# Patient Record
Sex: MALE | URBAN - METROPOLITAN AREA
[De-identification: names, ages, dates, MRNs, and addresses within clinical notes are randomized per-mention and may not be internally consistent; named-entity substitution may affect disease eponyms.]

---

## 2017-02-16 ENCOUNTER — OFFICE VISIT (OUTPATIENT)
Dept: INTERNAL MEDICINE | Age: 52
End: 2017-02-16

## 2017-02-16 VITALS
DIASTOLIC BLOOD PRESSURE: 78 MMHG | OXYGEN SATURATION: 97 % | SYSTOLIC BLOOD PRESSURE: 122 MMHG | BODY MASS INDEX: 26.4 KG/M2 | TEMPERATURE: 97.8 F | WEIGHT: 184 LBS | HEART RATE: 96 BPM

## 2017-02-16 DIAGNOSIS — F34.1 DYSTHYMIA: ICD-10-CM

## 2017-02-16 DIAGNOSIS — E53.8 VITAMIN B12 DEFICIENCY: Chronic | ICD-10-CM

## 2017-02-16 DIAGNOSIS — F42.9 OBSESSIVE-COMPULSIVE DISORDER: Chronic | ICD-10-CM

## 2017-02-16 DIAGNOSIS — J06.9 UPPER RESPIRATORY TRACT INFECTION, UNSPECIFIED TYPE: Primary | ICD-10-CM

## 2017-02-16 PROCEDURE — 99214 OFFICE O/P EST MOD 30 MIN: CPT | Performed by: INTERNAL MEDICINE

## 2017-02-16 PROCEDURE — 96372 THER/PROPH/DIAG INJ SC/IM: CPT | Performed by: INTERNAL MEDICINE

## 2017-02-16 RX ORDER — CYANOCOBALAMIN 1000 UG/ML
1000 INJECTION INTRAMUSCULAR; SUBCUTANEOUS ONCE
Status: COMPLETED | OUTPATIENT
Start: 2017-02-16 | End: 2017-02-16

## 2017-02-16 RX ORDER — CEFDINIR 300 MG/1
300 CAPSULE ORAL 2 TIMES DAILY
Qty: 20 CAPSULE | Refills: 0 | Status: SHIPPED | OUTPATIENT
Start: 2017-02-16 | End: 2019-10-09

## 2017-02-16 RX ADMIN — CYANOCOBALAMIN 1000 MCG: 1000 INJECTION INTRAMUSCULAR; SUBCUTANEOUS at 16:13

## 2017-02-16 ASSESSMENT — PATIENT HEALTH QUESTIONNAIRE - PHQ9
1. LITTLE INTEREST OR PLEASURE IN DOING THINGS: 0
SUM OF ALL RESPONSES TO PHQ9 QUESTIONS 1 & 2: 0
SUM OF ALL RESPONSES TO PHQ QUESTIONS 1-9: 0
2. FEELING DOWN, DEPRESSED OR HOPELESS: 0

## 2017-02-19 RX ORDER — FLUOXETINE HYDROCHLORIDE 20 MG/1
100 CAPSULE ORAL DAILY
COMMUNITY
Start: 2017-02-19

## 2017-02-19 ASSESSMENT — ENCOUNTER SYMPTOMS
WHEEZING: 0
CHOKING: 0
SORE THROAT: 1
VOICE CHANGE: 1
APNEA: 0
CHEST TIGHTNESS: 0
EYES NEGATIVE: 1
GASTROINTESTINAL NEGATIVE: 1
SHORTNESS OF BREATH: 0
STRIDOR: 0
FACIAL SWELLING: 0
SINUS PRESSURE: 0
COUGH: 1

## 2017-05-31 ENCOUNTER — NURSE ONLY (OUTPATIENT)
Dept: INTERNAL MEDICINE | Age: 52
End: 2017-05-31

## 2017-05-31 DIAGNOSIS — E53.8 VITAMIN B12 DEFICIENCY: Primary | Chronic | ICD-10-CM

## 2017-05-31 PROCEDURE — 96372 THER/PROPH/DIAG INJ SC/IM: CPT | Performed by: INTERNAL MEDICINE

## 2017-05-31 RX ORDER — CYANOCOBALAMIN 1000 UG/ML
1000 INJECTION INTRAMUSCULAR; SUBCUTANEOUS ONCE
Status: COMPLETED | OUTPATIENT
Start: 2017-05-31 | End: 2017-05-31

## 2017-05-31 RX ADMIN — CYANOCOBALAMIN 1000 MCG: 1000 INJECTION INTRAMUSCULAR; SUBCUTANEOUS at 09:43

## 2017-07-09 ENCOUNTER — TELEPHONE (OUTPATIENT)
Dept: INTERNAL MEDICINE | Age: 52
End: 2017-07-09

## 2017-07-09 DIAGNOSIS — E78.5 HYPERLIPIDEMIA, UNSPECIFIED HYPERLIPIDEMIA TYPE: ICD-10-CM

## 2017-07-09 DIAGNOSIS — R79.89 ELEVATED LIVER FUNCTION TESTS: Primary | ICD-10-CM

## 2017-07-09 DIAGNOSIS — E03.9 HYPOTHYROIDISM, UNSPECIFIED TYPE: Chronic | ICD-10-CM

## 2017-07-11 PROBLEM — R79.89 ELEVATED LIVER FUNCTION TESTS: Status: ACTIVE | Noted: 2017-07-11

## 2017-07-19 ENCOUNTER — TELEPHONE (OUTPATIENT)
Dept: INTERNAL MEDICINE | Age: 52
End: 2017-07-19

## 2017-07-19 ENCOUNTER — NURSE ONLY (OUTPATIENT)
Dept: INTERNAL MEDICINE | Age: 52
End: 2017-07-19

## 2017-07-19 DIAGNOSIS — E78.5 HYPERLIPIDEMIA, UNSPECIFIED HYPERLIPIDEMIA TYPE: Chronic | ICD-10-CM

## 2017-07-19 DIAGNOSIS — E03.9 HYPOTHYROIDISM, UNSPECIFIED TYPE: Chronic | ICD-10-CM

## 2017-07-19 DIAGNOSIS — R79.89 ELEVATED LIVER FUNCTION TESTS: ICD-10-CM

## 2017-07-19 DIAGNOSIS — E78.5 HYPERLIPIDEMIA, UNSPECIFIED HYPERLIPIDEMIA TYPE: ICD-10-CM

## 2017-07-19 DIAGNOSIS — E53.8 B12 DEFICIENCY: Primary | ICD-10-CM

## 2017-07-19 DIAGNOSIS — E53.8 VITAMIN B12 DEFICIENCY: Chronic | ICD-10-CM

## 2017-07-19 DIAGNOSIS — G47.33 OBSTRUCTIVE SLEEP APNEA: ICD-10-CM

## 2017-07-19 DIAGNOSIS — L98.9 SKIN LESION: Primary | ICD-10-CM

## 2017-07-19 PROBLEM — J06.9 UPPER RESPIRATORY TRACT INFECTION: Status: RESOLVED | Noted: 2017-02-16 | Resolved: 2017-07-19

## 2017-07-19 LAB
ALBUMIN SERPL-MCNC: 4.8 G/DL (ref 3.4–5)
ALP BLD-CCNC: 83 U/L (ref 40–129)
ALT SERPL-CCNC: 48 U/L (ref 10–40)
AST SERPL-CCNC: 38 U/L (ref 15–37)
BILIRUB SERPL-MCNC: 0.5 MG/DL (ref 0–1)
BILIRUBIN DIRECT: <0.2 MG/DL (ref 0–0.3)
BILIRUBIN, INDIRECT: ABNORMAL MG/DL (ref 0–1)
CHOLESTEROL, TOTAL: 203 MG/DL (ref 0–199)
HDLC SERPL-MCNC: 30 MG/DL (ref 40–60)
LDL CHOLESTEROL CALCULATED: 118 MG/DL
TOTAL PROTEIN: 7 G/DL (ref 6.4–8.2)
TRIGL SERPL-MCNC: 276 MG/DL (ref 0–150)
TSH SERPL DL<=0.05 MIU/L-ACNC: 6.77 UIU/ML (ref 0.27–4.2)
VLDLC SERPL CALC-MCNC: 55 MG/DL

## 2017-07-19 PROCEDURE — 96372 THER/PROPH/DIAG INJ SC/IM: CPT | Performed by: INTERNAL MEDICINE

## 2017-07-19 RX ORDER — CYANOCOBALAMIN 1000 UG/ML
1000 INJECTION INTRAMUSCULAR; SUBCUTANEOUS ONCE
Status: COMPLETED | OUTPATIENT
Start: 2017-07-19 | End: 2017-07-19

## 2017-07-19 RX ORDER — LEVOTHYROXINE SODIUM 75 MCG
75 TABLET ORAL DAILY
Qty: 30 TABLET | Refills: 12 | Status: SHIPPED | OUTPATIENT
Start: 2017-07-19

## 2017-07-19 RX ADMIN — CYANOCOBALAMIN 1000 MCG: 1000 INJECTION INTRAMUSCULAR; SUBCUTANEOUS at 16:26

## 2017-07-20 DIAGNOSIS — E03.9 HYPOTHYROIDISM, UNSPECIFIED TYPE: Primary | Chronic | ICD-10-CM

## 2017-07-20 DIAGNOSIS — E53.8 VITAMIN B12 DEFICIENCY: Chronic | ICD-10-CM

## 2017-07-26 ENCOUNTER — NURSE ONLY (OUTPATIENT)
Dept: INTERNAL MEDICINE | Age: 52
End: 2017-07-26

## 2017-07-26 DIAGNOSIS — E53.8 VITAMIN B12 DEFICIENCY: Primary | Chronic | ICD-10-CM

## 2017-07-26 PROCEDURE — 96372 THER/PROPH/DIAG INJ SC/IM: CPT | Performed by: INTERNAL MEDICINE

## 2017-07-26 RX ORDER — CYANOCOBALAMIN 1000 UG/ML
1000 INJECTION INTRAMUSCULAR; SUBCUTANEOUS ONCE
Status: COMPLETED | OUTPATIENT
Start: 2017-07-26 | End: 2017-07-26

## 2017-07-26 RX ADMIN — CYANOCOBALAMIN 1000 MCG: 1000 INJECTION INTRAMUSCULAR; SUBCUTANEOUS at 16:57

## 2017-08-02 ENCOUNTER — NURSE ONLY (OUTPATIENT)
Dept: INTERNAL MEDICINE | Age: 52
End: 2017-08-02

## 2017-08-02 DIAGNOSIS — E53.8 VITAMIN B12 DEFICIENCY: Primary | ICD-10-CM

## 2017-08-02 PROCEDURE — 96372 THER/PROPH/DIAG INJ SC/IM: CPT | Performed by: INTERNAL MEDICINE

## 2017-08-02 RX ORDER — CYANOCOBALAMIN 1000 UG/ML
1000 INJECTION INTRAMUSCULAR; SUBCUTANEOUS ONCE
Status: COMPLETED | OUTPATIENT
Start: 2017-08-02 | End: 2017-08-02

## 2017-08-09 ENCOUNTER — NURSE ONLY (OUTPATIENT)
Dept: INTERNAL MEDICINE | Age: 52
End: 2017-08-09

## 2017-08-09 DIAGNOSIS — E53.8 VITAMIN B12 DEFICIENCY: Primary | ICD-10-CM

## 2017-08-09 PROCEDURE — 96372 THER/PROPH/DIAG INJ SC/IM: CPT | Performed by: INTERNAL MEDICINE

## 2017-08-09 RX ORDER — CYANOCOBALAMIN 1000 UG/ML
1000 INJECTION INTRAMUSCULAR; SUBCUTANEOUS ONCE
Status: COMPLETED | OUTPATIENT
Start: 2017-08-09 | End: 2017-08-09

## 2017-08-09 RX ADMIN — CYANOCOBALAMIN 1000 MCG: 1000 INJECTION INTRAMUSCULAR; SUBCUTANEOUS at 15:24

## 2017-09-27 ENCOUNTER — NURSE ONLY (OUTPATIENT)
Dept: INTERNAL MEDICINE | Age: 52
End: 2017-09-27

## 2017-09-27 ENCOUNTER — TELEPHONE (OUTPATIENT)
Dept: INTERNAL MEDICINE | Age: 52
End: 2017-09-27

## 2017-09-27 DIAGNOSIS — E53.8 VITAMIN B12 DEFICIENCY: Chronic | ICD-10-CM

## 2017-09-27 DIAGNOSIS — Z12.5 SPECIAL SCREENING FOR MALIGNANT NEOPLASM OF PROSTATE: ICD-10-CM

## 2017-09-27 DIAGNOSIS — R79.89 ELEVATED LIVER FUNCTION TESTS: ICD-10-CM

## 2017-09-27 DIAGNOSIS — E53.8 VITAMIN B12 DEFICIENCY: Primary | Chronic | ICD-10-CM

## 2017-09-27 DIAGNOSIS — Z12.5 SPECIAL SCREENING FOR MALIGNANT NEOPLASM OF PROSTATE: Primary | ICD-10-CM

## 2017-09-27 DIAGNOSIS — E78.5 HYPERLIPIDEMIA, UNSPECIFIED HYPERLIPIDEMIA TYPE: Chronic | ICD-10-CM

## 2017-09-27 DIAGNOSIS — E03.9 HYPOTHYROIDISM, UNSPECIFIED TYPE: Chronic | ICD-10-CM

## 2017-09-27 LAB
ALBUMIN SERPL-MCNC: 4.8 G/DL (ref 3.4–5)
ALP BLD-CCNC: 89 U/L (ref 40–129)
ALT SERPL-CCNC: 28 U/L (ref 10–40)
AST SERPL-CCNC: 25 U/L (ref 15–37)
BILIRUB SERPL-MCNC: 0.4 MG/DL (ref 0–1)
BILIRUBIN DIRECT: <0.2 MG/DL (ref 0–0.3)
BILIRUBIN, INDIRECT: NORMAL MG/DL (ref 0–1)
CHOLESTEROL, TOTAL: 209 MG/DL (ref 0–199)
HDLC SERPL-MCNC: 39 MG/DL (ref 40–60)
LDL CHOLESTEROL CALCULATED: 127 MG/DL
PROSTATE SPECIFIC ANTIGEN: 1 NG/ML (ref 0–4)
TOTAL PROTEIN: 7.1 G/DL (ref 6.4–8.2)
TRIGL SERPL-MCNC: 216 MG/DL (ref 0–150)
TSH SERPL DL<=0.05 MIU/L-ACNC: 2.69 UIU/ML (ref 0.27–4.2)
VITAMIN B-12: >2000 PG/ML (ref 211–911)
VLDLC SERPL CALC-MCNC: 43 MG/DL

## 2017-09-27 PROCEDURE — 96372 THER/PROPH/DIAG INJ SC/IM: CPT | Performed by: INTERNAL MEDICINE

## 2017-09-27 RX ORDER — CYANOCOBALAMIN 1000 UG/ML
1000 INJECTION INTRAMUSCULAR; SUBCUTANEOUS ONCE
Status: COMPLETED | OUTPATIENT
Start: 2017-09-27 | End: 2017-09-27

## 2017-09-27 RX ADMIN — CYANOCOBALAMIN 1000 MCG: 1000 INJECTION INTRAMUSCULAR; SUBCUTANEOUS at 14:05

## 2017-09-28 ENCOUNTER — TELEPHONE (OUTPATIENT)
Dept: INTERNAL MEDICINE | Age: 52
End: 2017-09-28

## 2017-09-29 ENCOUNTER — TELEPHONE (OUTPATIENT)
Dept: INTERNAL MEDICINE | Age: 52
End: 2017-09-29

## 2017-12-14 ENCOUNTER — INITIAL CONSULT (OUTPATIENT)
Dept: PULMONOLOGY | Age: 52
End: 2017-12-14

## 2017-12-14 VITALS
OXYGEN SATURATION: 96 % | HEART RATE: 101 BPM | BODY MASS INDEX: 27.2 KG/M2 | DIASTOLIC BLOOD PRESSURE: 60 MMHG | WEIGHT: 190 LBS | HEIGHT: 70 IN | SYSTOLIC BLOOD PRESSURE: 102 MMHG

## 2017-12-14 DIAGNOSIS — K21.9 GASTROESOPHAGEAL REFLUX DISEASE WITHOUT ESOPHAGITIS: Chronic | ICD-10-CM

## 2017-12-14 DIAGNOSIS — E03.9 HYPOTHYROIDISM, UNSPECIFIED TYPE: Chronic | ICD-10-CM

## 2017-12-14 DIAGNOSIS — R06.83 SNORING: ICD-10-CM

## 2017-12-14 DIAGNOSIS — G47.10 HYPERSOMNIA: Primary | ICD-10-CM

## 2017-12-14 PROCEDURE — 99244 OFF/OP CNSLTJ NEW/EST MOD 40: CPT | Performed by: INTERNAL MEDICINE

## 2017-12-14 ASSESSMENT — SLEEP AND FATIGUE QUESTIONNAIRES
HOW LIKELY ARE YOU TO NOD OFF OR FALL ASLEEP WHILE SITTING AND READING: 3
HOW LIKELY ARE YOU TO NOD OFF OR FALL ASLEEP WHEN YOU ARE A PASSENGER IN A CAR FOR AN HOUR WITHOUT A BREAK: 2
HOW LIKELY ARE YOU TO NOD OFF OR FALL ASLEEP WHILE SITTING AND TALKING TO SOMEONE: 0
HOW LIKELY ARE YOU TO NOD OFF OR FALL ASLEEP WHILE WATCHING TV: 2
HOW LIKELY ARE YOU TO NOD OFF OR FALL ASLEEP WHILE SITTING INACTIVE IN A PUBLIC PLACE: 0
ESS TOTAL SCORE: 10
HOW LIKELY ARE YOU TO NOD OFF OR FALL ASLEEP WHILE SITTING QUIETLY AFTER LUNCH WITHOUT ALCOHOL: 1
NECK CIRCUMFERENCE (INCHES): 16
HOW LIKELY ARE YOU TO NOD OFF OR FALL ASLEEP IN A CAR, WHILE STOPPED FOR A FEW MINUTES IN TRAFFIC: 0
HOW LIKELY ARE YOU TO NOD OFF OR FALL ASLEEP WHILE LYING DOWN TO REST IN THE AFTERNOON WHEN CIRCUMSTANCES PERMIT: 2

## 2017-12-14 ASSESSMENT — ENCOUNTER SYMPTOMS
CHOKING: 0
CHEST TIGHTNESS: 0
VOMITING: 0
ABDOMINAL DISTENTION: 0
NAUSEA: 0
RHINORRHEA: 0
PHOTOPHOBIA: 0
SHORTNESS OF BREATH: 0
ALLERGIC/IMMUNOLOGIC NEGATIVE: 1
APNEA: 1
ABDOMINAL PAIN: 0
EYE PAIN: 0

## 2017-12-14 NOTE — PROGRESS NOTES
Isabelle Teran MD, FAA, [facility] HEART AND SURGICAL [facility]  [address]  3rd Floor, [address] [facility], [address] [phone]   [address] [place] [address] 25 [facility]  [address] [place] [address] Nayeli Renee Lulu Husain 37 [phone]     [address] MEDICINE    Subjective:     Patient ID: Bernie Cary is a 45 y.o. male. Chief Complaint   Patient presents with    Sleep Apnea       HPI:        Bernie Cary is a 45 y.o. male referred by Dr. Parish Fenton for a sleep evaluation. He complains of snoring, periods of not breathing, tossing and turning, decreased memory, decreased concentration, excessive daytime sleepiness, awakening in the middle of the night because of headaches, urination, feels sleepy during the day,  and drowsiness while driving but he denies knees buckling with laughing, completely or partially paralyzed while falling asleep or waking up. Symptoms began >20 years ago, gradually worsening since that time. Previous evaluation and treatment has included- PSG. Study over 20 years ago, no old records. Was told was borderline for RAGHU.    DOT/CDL - No  FAA/'s license - No      Previous Report(s) Reviewed: historical medical records, office notes and referral letter/letters     [place] - Total score: 10    Caffeine Intake - 2 cups of caffeinated coffee per day(s)    Social History     Social History    Marital status:      Spouse name: Lowery Buerger Number of children: 3    Years of education: N/A     Occupational History    Not on file.      Social History Main Topics    Smoking status: Former Smoker    Smokeless tobacco: Never Used      Comment: quit smoking in 2007    Alcohol use Yes      Comment: social    Drug use: No    Sexual activity: Yes     Partners: Female      Comment:  - [email or web address]      Other Topics Concern    Not on file     Social History Narrative    Past Medical History     obsessive compulsive disorder    Bender's Hyperlipidemia     Hypothyroidism     Nonspecific elevation of levels of transaminase or lactic acid dehydrogenase (LDH)     Obsessive-compulsive disorder 7/6/2010    Other and unspecified disc disorder of cervical region     Tobacco use disorder     Vitamin B12 deficiency 12/20/2016    Vitamin D deficiency 12/20/2016       Past Surgical History:   Procedure Laterality Date    COLONOSCOPY  December 3, 2004    Dr. Krystle Jaimes    COLONOSCOPY  September 14, 2010    Dr. Krystle Jaimes    COLONOSCOPY  12-15-15    Dr. Jaspreet Carlson  12/20/2016 December 22 2016 Dr Marleni Diez  April 4, 2012    laparoscopic - bilateral - direct inguinal hernia repair (right greater than left) - mesh was used bilaterally ----  Dr. Adeola Salcedo  February 2006    Dr. Jason Hall  June 23, 2009    Dr. Jason Hall  September 14, 2010    Dr. Jason Hall  July 17, 2014    Dr. Umer Acosta  1/26/16    Dr. Derick Dooley       Family History   Problem Relation Age of Onset    Obesity Brother        Review of Systems   Constitutional: Positive for fatigue. Negative for activity change and appetite change. HENT: Positive for congestion. Negative for nosebleeds, postnasal drip, rhinorrhea and sneezing. Eyes: Negative for photophobia, pain and visual disturbance. Respiratory: Positive for apnea. Negative for choking, chest tightness and shortness of breath. Cardiovascular: Negative. Gastrointestinal: Negative for abdominal distention, abdominal pain, nausea and vomiting. Endocrine: Negative for cold intolerance and heat intolerance. Genitourinary: Positive for frequency.  Negative for difficulty Pulmonary/Chest: Effort normal and breath sounds normal. No apnea. No respiratory distress. He has no wheezes. He has no rhonchi. He has no rales. Abdominal: Soft. Bowel sounds are normal. He exhibits no distension. There is no tenderness. Musculoskeletal: Normal range of motion. He exhibits no edema. Lymphadenopathy:        Head (right side): No submental, no submandibular and no tonsillar adenopathy present. Head (left side): No submental, no submandibular and no tonsillar adenopathy present. Neurological: He is alert and oriented to person, place, and time. Skin: Skin is warm, dry and intact. No cyanosis. Nails show no clubbing. Psychiatric: He has a normal mood and affect. His speech is normal and behavior is normal. Thought content normal.   Nursing note and vitals reviewed. Assessment:      1. Hypersomnia  Home Sleep Study    New problem, needs w/u   2. Snoring  Home Sleep Study    New problem, needs w/u   3. Hypothyroidism, unspecified type Stable    4. Gastroesophageal reflux disease without esophagitis Stable         Plan:      Differential diagnosis includes but not limited to: RAGHU, PLMD's, narcolepsy, parasomnias Reviewed RAGHU (highest likelihood Dx): pathophysiology, diagnosis, complications and treatment. Instructed him not to drive if drowsy. Continue medications per her PCP and other physicians. Limit caffeine use after 3pm. Standard of care is to do in-lab PSG but insurance is mandating an inferior HST. 1 wk follow up after study to review his results. The chronic medical conditions listed are directly related to the primary diagnosis listed above. The management of the primary diagnosis affects the secondary diagnosis and vice versa. Cont meds for hypothyroid and GERD.     Orders Placed This Encounter   Procedures   [address] [place] JHAVERI, [address] [facility] [address] [facility] and [address] [facility]

## 2017-12-14 NOTE — LETTER
[facility] Sleep Medicine  [address]  [address]  [place] [address]  Phone: [phone]  Fax: [phone]      December 14, 2017       Patient: Bernie Cary   MR Number: [id number]   YOB: 1965   Date of Visit: 12/14/2017     Thank you for allowing me to participate in the care of Bernie Cary. Here is my assessment and plan. Also attached is a copy of his consult note:    ASSESSMENT:  Yogesh Diaz was seen today for sleep apnea. Diagnoses and all orders for this visit:    Hypersomnia  Comments:  New problem, needs w/u  Orders:  -     Home Sleep Study; Future    Snoring  Comments:  New problem, needs w/u  Orders:  -     Home Sleep Study; Future    Hypothyroidism, unspecified type    Gastroesophageal reflux disease without esophagitis        Plan:     Differential diagnosis includes but not limited to: RAGHU, PLMD's, narcolepsy, parasomnias Reviewed RAGHU (highest likelihood Dx): pathophysiology, diagnosis, complications and treatment. Instructed him not to drive if drowsy. Continue medications per her PCP and other physicians. Limit caffeine use after 3pm. Standard of care is to do in-lab PSG but insurance is mandating an inferior HST. 1 wk follow up after study to review his results. The chronic medical conditions listed are directly related to the primary diagnosis listed above. The management of the primary diagnosis affects the secondary diagnosis and vice versa. Cont meds for hypothyroid and GERD. Orders Placed This Encounter   Procedures    Home Sleep Study         If you have questions or concerns, please do not hesitate to call me. I look forward to following Yogesh Diaz along with you.     Sincerely,      Cesilia Luque MD    CC providers:  Anish Vinson, [address] [facility] [address]  VIA Mail

## 2018-01-08 ENCOUNTER — HOSPITAL ENCOUNTER (OUTPATIENT)
Dept: SLEEP MEDICINE | Age: 53
Discharge: HOME | End: 2018-01-31
Admitting: INTERNAL MEDICINE

## 2018-01-08 DIAGNOSIS — R06.83 SNORING: ICD-10-CM

## 2018-01-08 DIAGNOSIS — G47.10 HYPERSOMNIA: ICD-10-CM

## 2018-01-09 PROCEDURE — 95806 SLEEP STUDY UNATT&RESP EFFT: CPT | Performed by: INTERNAL MEDICINE

## 2018-01-22 ENCOUNTER — TELEPHONE (OUTPATIENT)
Dept: SLEEP MEDICINE | Age: 53
End: 2018-01-22

## 2018-01-22 NOTE — TELEPHONE ENCOUNTER
My chart message given to me by sleep lab  (lew) with pt's correct phone #. Results of study were reviewed with pt. Pt asking why he was not notified sooner due to the severity of his sleep apnea. Explained to pt that the #'s in her chart were both non working #'s. When numbers were read to pr he states the numbers were old numbers. Explained to pt that a letter was sent to his address to let him know we were unable to reach him at the #s we have. Pt states that he did not receive any letter. While on the phone with pt #'s were changed and spouse was added as emergency contact. Pt states that he is angry he spent time filling out paperwork while here and we do not have the information. .  Pt asking about ordering unit and process was explained to pt. Pt may choose a DME or if he doesn't have a preference we order from [address]. Pt states that he would like to have a choice and was told there are many DMEs and suggested to pt he may call his insurance to find who is in network. Pt states he thought we would do that. Tried to explain to pt that his insurance and the DME work this out that we do not provide the unit. Pt asking to speak with Dr. Kumar Corrigan and was told he does not take calls during office hour and that I would be happy to give him a message. Pt states if he does not call me within 24 hours I may not be one of his pts. Message was sent to Dr. Kumar Corrigan. Will wait for pt call to order unit.

## 2018-02-01 ENCOUNTER — HOSPITAL ENCOUNTER (OUTPATIENT)
Dept: OTHER | Age: 53
Discharge: HOME | End: 2018-02-28
Attending: INTERNAL MEDICINE | Admitting: INTERNAL MEDICINE

## 2018-02-02 ENCOUNTER — TELEPHONE (OUTPATIENT)
Dept: INTERNAL MEDICINE | Age: 53
End: 2018-02-02

## 2018-02-02 NOTE — TELEPHONE ENCOUNTER
We received a medical records request from [address] [place] [address] Dr. Destiny Mercedes dated 1-8-2018. I faxed the release to [employer] from our office today. I am also sending records [facility]office mail [facility] [address] [facility]. I will scan the release, the records that I am sending and the fax success into media and attach it to this phone note. I will close this phone note. Please call [facility] for any other questions or information.

## 2018-02-21 ENCOUNTER — OFFICE VISIT (OUTPATIENT)
Dept: DERMATOLOGY | Age: 53
End: 2018-02-21

## 2018-02-21 DIAGNOSIS — L82.1 SK (SEBORRHEIC KERATOSIS): Primary | ICD-10-CM

## 2018-02-21 DIAGNOSIS — L91.8 SKIN TAG: ICD-10-CM

## 2018-02-21 DIAGNOSIS — D23.5 DERMAL NEVUS OF BACK: ICD-10-CM

## 2018-02-21 PROCEDURE — 99202 OFFICE O/P NEW SF 15 MIN: CPT | Performed by: DERMATOLOGY

## 2018-02-21 NOTE — PROGRESS NOTES
[facility] Dermatology  Carlos Florez  1965    45 y.o. male     Date of Visit: 2/21/2018    I was asked to see this patient by Brigido Groves MD.    Chief Complaint: skin lesions    Chief Complaint   Patient presents with    Skin Lesion     R arm, back    Skin Tag     Upper thigh had for 20 years        History of Present Illness: \"Marcus\"    1. He c/o several lesions on the R arm and back - not bothersome. 2.  He c/o an asymptomatic lesion in the groin. 3.  He also complains of a raised lesion on the upper back. Review of Systems:  Skin: No new or changing moles. Past Medical History, Family History, Surgical History, Medications and Allergies reviewed.     Past Medical History:   Diagnosis Date    Allergic rhinitis 5/11/2012    Bender's esophagus     Colon polyps 8/22/2011    Depression     Erectile dysfunction     Gastroesophageal reflux disease without esophagitis 12/2/2015    GERD (gastroesophageal reflux disease)     Hemorrhoids, external     Hyperlipidemia     Hypothyroidism     Nonspecific elevation of levels of transaminase or lactic acid dehydrogenase (LDH)     Obsessive-compulsive disorder 7/6/2010    Other and unspecified disc disorder of cervical region     Tobacco use disorder     Vitamin B12 deficiency 12/20/2016    Vitamin D deficiency 12/20/2016     Past Surgical History:   Procedure Laterality Date    COLONOSCOPY  December 3, 2004    Dr. Lurdes Massey    COLONOSCOPY  September 14, 2010    Dr. Lurdes Massey    COLONOSCOPY  12-15-15    Dr. Modi Body  12/20/2016 December 22 2016 Dr Normie Dakins  April 4, 2012    laparoscopic - bilateral - direct inguinal hernia repair (right greater than left) - mesh was used bilaterally ----  Dr. Michael James

## 2018-04-23 ENCOUNTER — TELEPHONE (OUTPATIENT)
Dept: SLEEP MEDICINE | Age: 53
End: 2018-04-23

## 2018-06-19 ENCOUNTER — OFFICE VISIT (OUTPATIENT)
Dept: DERMATOLOGY | Age: 53
End: 2018-06-19

## 2018-06-19 DIAGNOSIS — L91.8 CUTANEOUS SKIN TAGS: Primary | ICD-10-CM

## 2018-06-19 DIAGNOSIS — L82.1 SK (SEBORRHEIC KERATOSIS): ICD-10-CM

## 2018-06-19 PROCEDURE — 99213 OFFICE O/P EST LOW 20 MIN: CPT | Performed by: DERMATOLOGY

## 2019-02-21 ENCOUNTER — OFFICE VISIT (OUTPATIENT)
Dept: DERMATOLOGY | Age: 54
End: 2019-02-21
Payer: COMMERCIAL

## 2019-02-21 DIAGNOSIS — L91.8 SKIN TAG: ICD-10-CM

## 2019-02-21 DIAGNOSIS — L82.1 SK (SEBORRHEIC KERATOSIS): Primary | ICD-10-CM

## 2019-02-21 DIAGNOSIS — B35.3 TINEA PEDIS OF RIGHT FOOT: ICD-10-CM

## 2019-02-21 PROCEDURE — 99213 OFFICE O/P EST LOW 20 MIN: CPT | Performed by: DERMATOLOGY

## 2019-02-21 RX ORDER — CICLOPIROX 7.7 MG/G
GEL TOPICAL
Qty: 30 G | Refills: 0 | Status: SHIPPED | OUTPATIENT
Start: 2019-02-21 | End: 2019-09-09 | Stop reason: SDUPTHER

## 2019-09-09 ENCOUNTER — OFFICE VISIT (OUTPATIENT)
Dept: DERMATOLOGY | Age: 54
End: 2019-09-09
Payer: COMMERCIAL

## 2019-09-09 DIAGNOSIS — L24.9 IRRITANT DERMATITIS: Primary | ICD-10-CM

## 2019-09-09 DIAGNOSIS — B35.6 TINEA CRURIS: ICD-10-CM

## 2019-09-09 DIAGNOSIS — L82.1 SEBORRHEIC KERATOSIS: ICD-10-CM

## 2019-09-09 PROCEDURE — 99213 OFFICE O/P EST LOW 20 MIN: CPT | Performed by: DERMATOLOGY

## 2019-09-09 RX ORDER — CICLOPIROX 7.7 MG/G
GEL TOPICAL
Qty: 30 G | Refills: 0 | Status: SHIPPED | OUTPATIENT
Start: 2019-09-09 | End: 2020-02-27

## 2019-09-09 RX ORDER — TRIAMCINOLONE ACETONIDE 1 MG/G
CREAM TOPICAL
Qty: 45 G | Refills: 0 | Status: SHIPPED | OUTPATIENT
Start: 2019-09-09 | End: 2020-02-27

## 2019-09-09 NOTE — PROGRESS NOTES
[facility] Dermatology  Janie Seymour MD  [phone]      Bridgette Jean  1965    52 y.o. male     Date of Visit: 9/9/2019    Chief Complaint: rash and skin lesions    History of Present Illness: \"Marcus\"    1. He presents today for a 10 day hx of a pruritic eruption in the groin and axillae. Using Lotrimin with some improvement. 2.  He also complains of a rough lesion on the chest.     Brother recently diagnosed with Mantle cell lymphoma. Review of Systems:  Gen: Feels well, good sense of health. Skin: No new or changing moles. Past Medical History, Family History, Surgical History, Medications and Allergies reviewed.     Past Medical History:   Diagnosis Date    Allergic rhinitis 5/11/2012    Bender's esophagus     Colon polyps 8/22/2011    Depression     Erectile dysfunction     Gastroesophageal reflux disease without esophagitis 12/2/2015    GERD (gastroesophageal reflux disease)     Hemorrhoids, external     Hyperlipidemia     Hypothyroidism     Nonspecific elevation of levels of transaminase or lactic acid dehydrogenase (LDH)     Obsessive-compulsive disorder 7/6/2010    Other and unspecified disc disorder of cervical region     Tobacco use disorder     Vitamin B12 deficiency 12/20/2016    Vitamin D deficiency 12/20/2016     Past Surgical History:   Procedure Laterality Date    COLONOSCOPY  December 3, 2004    Dr. Paula Smith  September 14, 2010    Dr. Honey Smith COLONOSCOPY  12-15-15    Dr. Grace Santana  12/20/2016 December 22 2016 Dr Dariela Arvizu  April 4, 2012    laparoscopic - bilateral - direct inguinal hernia repair (right greater than left) - mesh was used bilaterally ----  Dr. Christopher Noyola  February 2006    Dr. Inocente Angel

## 2019-09-15 ENCOUNTER — PATIENT MESSAGE (OUTPATIENT)
Dept: DERMATOLOGY | Age: 54
End: 2019-09-15

## 2019-09-16 ENCOUNTER — TELEPHONE (OUTPATIENT)
Dept: DERMATOLOGY | Age: 54
End: 2019-09-16

## 2019-09-16 ENCOUNTER — OFFICE VISIT (OUTPATIENT)
Dept: DERMATOLOGY | Age: 54
End: 2019-09-16
Payer: COMMERCIAL

## 2019-09-16 DIAGNOSIS — R21 RASH: Primary | ICD-10-CM

## 2019-09-16 PROCEDURE — 99213 OFFICE O/P EST LOW 20 MIN: CPT | Performed by: DERMATOLOGY

## 2019-09-16 RX ORDER — TRIAMCINOLONE ACETONIDE 1 MG/G
CREAM TOPICAL
Qty: 450 G | Refills: 0 | Status: SHIPPED | OUTPATIENT
Start: 2019-09-16 | End: 2019-11-05

## 2019-09-16 RX ORDER — PREDNISONE 10 MG/1
TABLET ORAL
Qty: 40 TABLET | Refills: 0 | Status: SHIPPED | OUTPATIENT
Start: 2019-09-16 | End: 2019-10-02

## 2019-10-01 ENCOUNTER — OFFICE VISIT (OUTPATIENT)
Dept: DERMATOLOGY | Age: 54
End: 2019-10-01
Payer: COMMERCIAL

## 2019-10-01 DIAGNOSIS — R21 RASH: Primary | ICD-10-CM

## 2019-10-01 PROCEDURE — 99213 OFFICE O/P EST LOW 20 MIN: CPT | Performed by: DERMATOLOGY

## 2019-10-01 PROCEDURE — 11104 PUNCH BX SKIN SINGLE LESION: CPT | Performed by: DERMATOLOGY

## 2019-10-01 RX ORDER — HYDROXYZINE HYDROCHLORIDE 25 MG/1
TABLET, FILM COATED ORAL
Qty: 60 TABLET | Refills: 1 | Status: SHIPPED | OUTPATIENT
Start: 2019-10-01 | End: 2020-02-27

## 2019-10-03 ENCOUNTER — PATIENT MESSAGE (OUTPATIENT)
Dept: DERMATOLOGY | Age: 54
End: 2019-10-03

## 2019-10-03 LAB — DERMATOLOGY PATHOLOGY REPORT: NORMAL

## 2019-10-08 ENCOUNTER — PATIENT MESSAGE (OUTPATIENT)
Dept: DERMATOLOGY | Age: 54
End: 2019-10-08

## 2019-10-09 ENCOUNTER — OFFICE VISIT (OUTPATIENT)
Dept: DERMATOLOGY | Age: 54
End: 2019-10-09
Payer: COMMERCIAL

## 2019-10-09 DIAGNOSIS — R21 RASH: Primary | ICD-10-CM

## 2019-10-09 PROCEDURE — 99213 OFFICE O/P EST LOW 20 MIN: CPT | Performed by: DERMATOLOGY

## 2019-10-09 RX ORDER — PREDNISONE 10 MG/1
TABLET ORAL
Qty: 40 TABLET | Refills: 0 | Status: SHIPPED | OUTPATIENT
Start: 2019-10-09 | End: 2019-10-10 | Stop reason: SDUPTHER

## 2019-10-10 RX ORDER — PREDNISONE 10 MG/1
TABLET ORAL
Qty: 40 TABLET | Refills: 0 | Status: SHIPPED | OUTPATIENT
Start: 2019-10-10 | End: 2019-10-26

## 2019-10-10 RX ORDER — PREDNISONE 10 MG/1
TABLET ORAL
Qty: 40 TABLET | Refills: 0 | OUTPATIENT
Start: 2019-10-10

## 2019-11-04 ENCOUNTER — TELEPHONE (OUTPATIENT)
Dept: DERMATOLOGY | Age: 54
End: 2019-11-04

## 2019-11-05 ENCOUNTER — OFFICE VISIT (OUTPATIENT)
Dept: DERMATOLOGY | Age: 54
End: 2019-11-05
Payer: COMMERCIAL

## 2019-11-05 DIAGNOSIS — L73.9 FOLLICULITIS: ICD-10-CM

## 2019-11-05 DIAGNOSIS — R21 RASH: Primary | ICD-10-CM

## 2019-11-05 PROCEDURE — 99213 OFFICE O/P EST LOW 20 MIN: CPT | Performed by: DERMATOLOGY

## 2020-02-27 ENCOUNTER — OFFICE VISIT (OUTPATIENT)
Dept: DERMATOLOGY | Age: 55
End: 2020-02-27
Payer: COMMERCIAL

## 2020-02-27 PROCEDURE — 99213 OFFICE O/P EST LOW 20 MIN: CPT | Performed by: DERMATOLOGY

## 2020-02-27 RX ORDER — DEXTROAMPHETAMINE SACCHARATE, AMPHETAMINE ASPARTATE, DEXTROAMPHETAMINE SULFATE AND AMPHETAMINE SULFATE 5; 5; 5; 5 MG/1; MG/1; MG/1; MG/1
TABLET ORAL
COMMUNITY
Start: 2020-02-19 | End: 2021-02-19

## 2020-02-27 RX ORDER — ATORVASTATIN CALCIUM 40 MG/1
40 TABLET, FILM COATED ORAL DAILY
COMMUNITY

## 2020-02-27 NOTE — PROGRESS NOTES
Pending sale to [facility]  Anselmo Hummel MD  [phone]      Calli Montilla  1965    62 y.o. male     Date of Visit: 2/27/2020    Chief Complaint: skin lesions    History of Present Illness:    1. He reports few stable lesions of the right temple. 2.  He also complains of several asymptomatic growths on the left arm and back. 3.  He has dry skin that is asymptomatic. Review of Systems:  Gen: Feels well, good sense of health. Skin: No new or changing moles. Past Medical History, Family History, Surgical History, Medications and Allergies reviewed.     Past Medical History:   Diagnosis Date    Allergic rhinitis 5/11/2012    Bender's esophagus     Colon polyps 8/22/2011    Depression     Erectile dysfunction     Gastroesophageal reflux disease without esophagitis 12/2/2015    GERD (gastroesophageal reflux disease)     Hemorrhoids, external     Hyperlipidemia     Hypothyroidism     Nonspecific elevation of levels of transaminase or lactic acid dehydrogenase (LDH)     Obsessive-compulsive disorder 7/6/2010    Other and unspecified disc disorder of cervical region     Tobacco use disorder     Vitamin B12 deficiency 12/20/2016    Vitamin D deficiency 12/20/2016     Past Surgical History:   Procedure Laterality Date    COLONOSCOPY  December 3, 2004    Dr. Winnie Soriano  September 14, 2010    Dr. Milind Wilson COLONOSCOPY  12-15-15    Dr. Charleen Sanchez  12/20/2016 December 22 2016 Dr Errol Zavala  April 4, 2012    laparoscopic - bilateral - direct inguinal hernia repair (right greater than left) - mesh was used bilaterally ----  Dr. Margy Jensen  February 2006    Dr. Bernice Workman  June 23, 2009    Dr. Steffi Rodriguez GASTROINTESTINAL ENDOSCOPY  September 14, 2010    Dr. Fani York ENDOSCOPY  July 17, 2014    Dr. Leighann Greco  1/26/16    Dr. Jeny Vazquez       No Known Allergies  Outpatient Medications Marked as Taking for the 2/27/20 encounter (Office Visit) with Maria R Sawyer MD   Medication Sig Dispense Refill    amphetamine-dextroamphetamine (ADDERALL) 20 MG tablet Take 1 tablet by mouth three times per day.  atorvastatin (LIPITOR) 40 MG tablet Take 40 mg by mouth daily      mupirocin (BACTROBAN) 2 % ointment Apply to affected area on the left leg twice daily until improved. 22 g 0    Cyanocobalamin 1000 MCG/ML KIT 1 mL by NOT APPLICABLE route      SYNTHROID 75 MCG tablet Take 1 tablet by mouth daily (Patient taking differently: Take 100 mcg by mouth daily ) 30 tablet 12    FLUoxetine (PROZAC) 20 MG capsule Take 5 capsules by mouth daily      rosuvastatin (CRESTOR) 10 MG tablet Take 1 tablet by mouth daily 30 tablet 12    dexlansoprazole (DEXILANT) 60 MG CPDR delayed release capsule Take 1 capsule by mouth daily           Physical Examination       The following were examined and determined to be normal: Psych/Neuro, Scalp/hair, Head/face, Conjunctivae/eyelids, Gums/teeth/lips, Neck, Breast/axilla/chest, Abdomen, Back, RUE, LUE, RLE, LLE and Nails/digits. The following were examined and determined to be abnormal: None. Well appearing. 1. Right temple with 2 well defined round smooth light brown macules. 2. Left arm, back with stuck on appearing verrucous grey brown papule. 3.  Arms and lower extremities with diffuse scaling of the skin. Assessment and Plan     1. Solar lentigo x 2     Monitor for change. 2. SK (seborrheic keratosis)     Reassurance. 3. Xerosis cutis     Encouraged daily use of emollients. Return in about 1 year (around 2/27/2021).

## 2020-11-19 ENCOUNTER — TELEPHONE (OUTPATIENT)
Dept: DERMATOLOGY | Age: 55
End: 2020-11-19

## 2020-11-19 NOTE — TELEPHONE ENCOUNTER
Pt calling states his daughter is in town will be a NP have something on her ankle would like to have  take a look at pls return call back @ [phone] to discuss

## 2020-12-07 ENCOUNTER — PATIENT MESSAGE (OUTPATIENT)
Dept: DERMATOLOGY | Age: 55
End: 2020-12-07

## 2020-12-07 RX ORDER — CICLOPIROX 7.7 MG/G
GEL TOPICAL
Qty: 30 G | Refills: 0 | Status: SHIPPED | OUTPATIENT
Start: 2020-12-07

## 2020-12-08 ENCOUNTER — PATIENT MESSAGE (OUTPATIENT)
Dept: DERMATOLOGY | Age: 55
End: 2020-12-08

## 2020-12-08 NOTE — TELEPHONE ENCOUNTER
From: Noel Branch  To: Vega Mcpherson MD  Sent: 12/8/2020 10:59 AM EST  Subject: Non-Urgent Medical Question    Thank you.      ----- Message -----   [facility]   FQUY:00/1/5579 8:52 AM EST   To:Hiro Bermeo   Subject:RE: Non-Urgent Medical Question    Dr Tamar Farias sent in a Ciclopirox (LOPROX) 0.77 % gel for you to use. Thank you,   Jess Ovalles       ----- Message -----   From:Hiro Bermeo   Sent:12/7/2020 9:28 AM EST   To:Nelson Kahn MD   Subject:Non-Urgent Medical Question    Hi Doc M. First, I hope you and the family are well! 2nd, thanks for seeing my daughter Doug Rizzo on such short notice. She had great things to say about you. Lastly, I've had jock itch for about a week and been using an over the counter athletes foot spray. I remember at one point you had prescribed something a bit stronger. Would you please do that again?     thanks [employer]

## 2020-12-11 ENCOUNTER — TELEPHONE (OUTPATIENT)
Dept: DERMATOLOGY | Age: 55
End: 2020-12-11

## 2020-12-11 ENCOUNTER — PATIENT MESSAGE (OUTPATIENT)
Dept: DERMATOLOGY | Age: 55
End: 2020-12-11

## 2020-12-11 NOTE — TELEPHONE ENCOUNTER
Patient is calling complaining of a rash that he was seen for previously, he says it has returned and would like to get ahead of it before it worsens.  Please call to schedule     [phone]

## 2020-12-11 NOTE — TELEPHONE ENCOUNTER
From: Melanie Doshi  To: Jess Bartlett MD  Sent: 12/11/2020 12:07 PM EST  Subject: Non-Urgent Medical Question    Please ask Dr Erika Quintanilla to call me at his convenience. I'm now beginning to have the itchy rash on my back and arm pit area again I had a year ago. I'd like to try and get this taken care of quickly before it gets as bad as last year. Thanks for your help! Jessica Mirna [phone]      ----- Message -----   From:BABAK Rg   TCRO:92/3/3640 8:52 AM EST   To:Hiro Bermeo   Subject:RE: Non-Urgent Medical Question    [facility],     Dr Erika Quintanilla sent in a Ciclopirox (LOPROX) 0.77 % gel for you to use. Thank you,   Keaton Swanson       ----- Message -----   From:Hiro Bermeo   Sent:12/7/2020 9:28 AM EST   To:Nelson Landa MD   Subject:Non-Urgent Medical Question    Hi Doc M. First, I hope you and the family are well! 2nd, thanks for seeing my daughter Christopher Mobley on such short notice. She had great things to say about you. Lastly, I've had jock itch for about a week and been using an over the counter athletes foot spray. I remember at one point you had prescribed something a bit stronger. Would you please do that again?     thanks Jessica Bradley

## 2020-12-14 ENCOUNTER — OFFICE VISIT (OUTPATIENT)
Dept: DERMATOLOGY | Age: 55
End: 2020-12-14
Payer: COMMERCIAL

## 2020-12-14 VITALS — TEMPERATURE: 97.3 F

## 2020-12-14 PROCEDURE — 99213 OFFICE O/P EST LOW 20 MIN: CPT | Performed by: DERMATOLOGY

## 2020-12-14 RX ORDER — TRIAMCINOLONE ACETONIDE 1 MG/G
CREAM TOPICAL
Qty: 450 G | Refills: 0 | Status: SHIPPED | OUTPATIENT
Start: 2020-12-14 | End: 2021-09-09 | Stop reason: SDUPTHER

## 2020-12-14 NOTE — PROGRESS NOTES
[facility] Dermat[facility]  Goyo Floyd MD  [phone]      Jorge Gregoryer  1965    60 y.o. male     Date of Visit: 12/14/2020    Chief Complaint: rash    History of Present Illness:    1. He presents today for a 3 week history of a pruritic eruption on the trunk and extremities. He also complains of involvement in the groin. He has been using Loprox cream over the last 5 days with minimal improvement. Itching is worse with heat and spicy foods. He has also been using a hot tub. Uses [place] Spring soap    2. He also complains of a persistent lesion on the right temple and right cheek. BRCA positive. Review of Systems:  Skin: No other concerning lesions or changing moles. Past Medical History, Family History, Surgical History, Medications and Allergies reviewed.     Past Medical History:   Diagnosis Date    Allergic rhinitis 5/11/2012    Bender's esophagus     Colon polyps 8/22/2011    Depression     Erectile dysfunction     Gastroesophageal reflux disease without esophagitis 12/2/2015    GERD (gastroesophageal reflux disease)     Hemorrhoids, external     Hyperlipidemia     Hypothyroidism     Nonspecific elevation of levels of transaminase or lactic acid dehydrogenase (LDH)     Obsessive-compulsive disorder 7/6/2010    Other and unspecified disc disorder of cervical region     Tobacco use disorder     Vitamin B12 deficiency 12/20/2016    Vitamin D deficiency 12/20/2016     Past Surgical History:   Procedure Laterality Date    COLONOSCOPY  December 3, 2004    Dr. Bradly Vaca    COLONOSCOPY  September 14, 2010    Dr. Bradly Vaca    COLONOSCOPY  12-15-15    Dr. Rochelle Shipman  12/20/2016 December 22 2016 Dr Giuliana Forrest  April 4, 2012    laparoscopic - bilateral - direct inguinal hernia repair (right greater than left) - mesh was used bilaterally ----  Dr. Elver Plascencia SURGERY  1971    PILONIDAL CYST EXCISION  1974    UPPER GASTROINTESTINAL ENDOSCOPY  February 2006    Dr. Uzma Allan ENDOSCOPY  June 23, 2009    Dr. Da Hendrickson  September 14, 2010    Dr. Da Hendrickson  July 17, 2014    Dr. Otoniel Holloway  1/26/16    Dr. Leticia Coronado       No Known Allergies  Outpatient Medications Marked as Taking for the 12/14/20 encounter (Office Visit) with Milind Damon MD   Medication Sig Dispense Refill    triamcinolone (KENALOG) 0.1 % cream Apply to affected area twice daily for up to 2 weeks or until improved. 450 g 0    Ciclopirox (LOPROX) 0.77 % gel Apply to the last web space on the right foot twice daily until improved. 30 g 0    amphetamine-dextroamphetamine (ADDERALL) 20 MG tablet Take 1 tablet by mouth three times per day.  Cyanocobalamin 1000 MCG/ML KIT 1 mL by NOT APPLICABLE route      SYNTHROID 75 MCG tablet Take 1 tablet by mouth daily (Patient taking differently: Take 100 mcg by mouth daily ) 30 tablet 12    FLUoxetine (PROZAC) 20 MG capsule Take 5 capsules by mouth daily      dexlansoprazole (DEXILANT) 60 MG CPDR delayed release capsule Take 1 capsule by mouth daily           Physical Examination       The following were examined and determined to be normal: Psych/Neuro, Scalp/hair, Head/face, Conjunctivae/eyelids, Gums/teeth/lips, Neck, Abdomen and Nails/digits. The following were examined and determined to be abnormal: Breast/axilla/chest, Back, RUE, LUE, RLE and LLE. Well appearing. 1.  Periaxillary regions, back, upper portions of the arms and lower extremities with ill-defined scaly erythematous patches. Inguinal folds with focally lichenified scaly erythematous patches. 2.  Right central cheek with a stuck on appearing verrucous brown papule.   Right temple with a stuck on appearing verrucous skin colored papule. Assessment and Plan     1. Chronic dermatitis -moderately extensive     Triamcinolone 0.1% cream twice daily until improved. Switch to fragrance free gentle cleanser like Dove for sensitive skin for bathing and limited to intertriginous regions. Cream based moisturizer 1-2 times daily. Avoid hot tub. 2. SK (seborrheic keratosis)     Reassurance. Return in about 1 year (around 12/14/2021).     --Milind Damon MD

## 2020-12-14 NOTE — PATIENT INSTRUCTIONS
DRY SKIN CARE    1. Do not take more than 1 shower or bath each day. Try to spend 10 minutes or less in the shower/bath. Avoid hot showers as this can damage the skin and make the dryness worse. 2. Use a moisturizing or mild soap such Dove (for sensitive skin), Cetaphil (Cleansing Bar,Gentle Body Wash, Restoraderm Soothing Wash), CeraVe Hydrating Body Wash, Eucerin Skin Calming Body Wash, or Aveeno Daily Moisturizing Body Wash. Antibacterial soaps can be too drying. 3. Use soap only on limited areas (face, underarms, groin and buttocks). Try to avoid using soap on the arms, legs, trunk and back. 4. After showering, pat dry with a towel and generously apply a thick moisturizer all over. Use a moisturizer every day even if you are not showering that particular day. 5. If you are able, apply the moisturizer a second time during the day as well. 6. If a prescription cream or ointment has been ordered for you, apply the prescription medication to affected areas after your bath/shower while the skin is still damp, then apply the moisturizer to the remainder of the skin. 7. When it comes to moisturizers, the thicker the better. Ointments (such as vaseline) are thicker than creams, and creams are thicker than lotions. Suggested over-the-counter moisturizers include:    · CeraVe Lotion or Cream  · Cetaphil Lotion or Cream  · Eucerin Advanced Repair Cream, Advanced Repair Lotion, Eczema Relief Cream or Intensive Repair Lotion.    · Lubriderm Lotion  · Vaseline (petroleum jelly)  · Aquaphor  · Anabel moisturizing lotion or cream  · Neutrogena Hand Cream  · Aveeno Moisturizing Cream or Lotion  · Curel Ultra Healing   · Vanicream Moisturizing Skin Cream

## 2021-05-10 ENCOUNTER — OFFICE VISIT (OUTPATIENT)
Dept: DERMATOLOGY | Age: 56
End: 2021-05-10
Payer: COMMERCIAL

## 2021-05-10 VITALS — TEMPERATURE: 97.4 F

## 2021-05-10 DIAGNOSIS — L81.4 SOLAR LENTIGO: Primary | ICD-10-CM

## 2021-05-10 DIAGNOSIS — L30.9 CHRONIC DERMATITIS: ICD-10-CM

## 2021-05-10 DIAGNOSIS — L82.1 SK (SEBORRHEIC KERATOSIS): ICD-10-CM

## 2021-05-10 PROCEDURE — 99213 OFFICE O/P EST LOW 20 MIN: CPT | Performed by: DERMATOLOGY

## 2021-05-10 NOTE — PROGRESS NOTES
[facility]  Brittany Barahona MD  [phone]      Chantelle Lundberg  1965    60 y.o. male     Date of Visit: 5/10/2021    Chief Complaint: skin lesions    History of Present Illness:    1. He reports a couple of persistent pigmented lesions on the right cheek that have may be gradually gotten a little larger. 2.  He complains of a couple of growths on the left temple, left shoulder and left forearm. 3.  Follow-up for history of chronic dermatitis on the trunk and extremities. Cleared with use of triamcinolone 0.1% cream.  He denies any recurrence. BRCA positive. Review of Systems:  Gen: Feels well, good sense of health. Past Medical History, Family History, Surgical History, Medications and Allergies reviewed.     Past Medical History:   Diagnosis Date    Allergic rhinitis 5/11/2012    Bender's esophagus     Colon polyps 8/22/2011    Depression     Erectile dysfunction     Gastroesophageal reflux disease without esophagitis 12/2/2015    GERD (gastroesophageal reflux disease)     Hemorrhoids, external     Hyperlipidemia     Hypothyroidism     Nonspecific elevation of levels of transaminase or lactic acid dehydrogenase (LDH)     Obsessive-compulsive disorder 7/6/2010    Other and unspecified disc disorder of cervical region     Tobacco use disorder     Vitamin B12 deficiency 12/20/2016    Vitamin D deficiency 12/20/2016     Past Surgical History:   Procedure Laterality Date    COLONOSCOPY  December 3, 2004    Dr. Teressa Braun  September 14, 2010    Dr. Tim Paredes    COLONOSCOPY  12-15-15    Dr. Tamara Rodriguez  12/20/2016 December 22 2016 Dr Perla Expose  April 4, 2012    laparoscopic - bilateral - direct inguinal hernia repair (right greater than left) - mesh was used bilaterally ----  Dr. Elgin Donohue [facility] ENDOSCOPY  February 2006    Dr. Neda Guardado   [facility] UPPER GASTROINTESTINAL ENDOSCOPY  June 23, 2009    Dr. Ayush Yeh  September 14, 2010    Dr. Ayush Yeh  July 17, 2014    Dr. Isha Mims  1/26/16    Dr. Jie Laguna       No Known Allergies  Outpatient Medications Marked as Taking for the 5/10/21 encounter (Office Visit) with Wallace Pardo MD   Medication Sig Dispense Refill    triamcinolone (KENALOG) 0.1 % cream Apply to affected area twice daily for up to 2 weeks or until improved. 450 g 0    Ciclopirox (LOPROX) 0.77 % gel Apply to the last web space on the right foot twice daily until improved. 30 g 0    atorvastatin (LIPITOR) 40 MG tablet Take 40 mg by mouth daily      Cyanocobalamin 1000 MCG/ML KIT 1 mL by NOT APPLICABLE route      SYNTHROID 75 MCG tablet Take 1 tablet by mouth daily (Patient taking differently: Take 100 mcg by mouth daily ) 30 tablet 12    FLUoxetine (PROZAC) 20 MG capsule Take 5 capsules by mouth daily      dexlansoprazole (DEXILANT) 60 MG CPDR delayed release capsule Take 1 capsule by mouth daily           Physical Examination       The following were examined and determined to be normal: Psych/Neuro, Scalp/hair, Head/face, Conjunctivae/eyelids, Gums/teeth/lips, Neck, Breast/axilla/chest, Abdomen, Back, RUE, LUE, RLE, LLE and Nails/digits. The following were examined and determined to be abnormal: None. Well-appearing. 1.  Right temple and superior lateral cheek with 2 well-defined round smooth light brown macules. 2.  Left temple with a small stuck on appearing verrucous brown papule. Left supraclavicular region with a small stuck on appearing verrucous light brown papule. Left mid extensor forearm with a stuck on appearing verrucous gray papule. 3.  Clear. Assessment and Plan     1.  Solar lentigo x 2 - benign appearing    Monitor for change. Encouraged sun protective behaviors including use of a hat and at least SPF 30 sunscreen. 2. SK (seborrheic keratosis)     Reassurance. 3. Chronic dermatitis - clear    Triamcinolone 0.1% cream twice daily as needed for recurrences. Return in about 1 year (around 5/10/2022).     --Shashank Mcintosh MD

## 2021-08-12 ENCOUNTER — PATIENT MESSAGE (OUTPATIENT)
Dept: DERMATOLOGY | Age: 56
End: 2021-08-12

## 2021-08-12 NOTE — TELEPHONE ENCOUNTER
From: Melanie Doshi  To: Jess Bartlett MD  Sent: 8/12/2021 1:42 PM EDT  Subject: Non-Urgent Medical Question    Hi Dr Erika Quintanilla- I hope you are well. I've had significant itching, thinking it's jock itch. I've used Lotrimin spray and had no relief.  Any recommendations? (sorry for the pic:)    Thanks    Vaishali Peace  [phone]

## 2021-08-13 RX ORDER — TRIAMCINOLONE ACETONIDE 1 MG/G
CREAM TOPICAL
Qty: 30 G | Refills: 2 | Status: SHIPPED | OUTPATIENT
Start: 2021-08-13 | End: 2021-09-09

## 2021-08-23 ENCOUNTER — TELEPHONE (OUTPATIENT)
Dept: DERMATOLOGY | Age: 56
End: 2021-08-23

## 2021-08-23 NOTE — TELEPHONE ENCOUNTER
Calling about rash in groin, upper arm, and elbow. For about 10 days. Using RX for groin but nothing for arm. Itching and blistering. Had something similar last year. Calling to see if he can get an appointment. Phone.  [phone]

## 2021-08-23 NOTE — TELEPHONE ENCOUNTER
Spoke to patient-he has not tried the triamcinolone since the prescription was sent on 8/13/2021. I advise patient to start using the cream on the current, itchy spots and that the 30 gram tube should last until Dr. Eve Alcantar is back in the office. Just to confirm: patient is to use triamcinolone on the arm, elbow, arm pit and groin?

## 2021-09-08 ENCOUNTER — TELEPHONE (OUTPATIENT)
Dept: DERMATOLOGY | Age: 56
End: 2021-09-08

## 2021-09-08 NOTE — TELEPHONE ENCOUNTER
Pt of Dr. Angy Ornelas   Pt is complaining of a rash on inner thigh area, itchy. Been there for a few weeks now. He would like to be seen tomorrow. Please advise, Thank you.

## 2021-09-09 ENCOUNTER — OFFICE VISIT (OUTPATIENT)
Dept: DERMATOLOGY | Age: 56
End: 2021-09-09
Payer: COMMERCIAL

## 2021-09-09 VITALS — TEMPERATURE: 97.9 F

## 2021-09-09 DIAGNOSIS — L30.9 DERMATITIS: Primary | ICD-10-CM

## 2021-09-09 PROCEDURE — 99213 OFFICE O/P EST LOW 20 MIN: CPT | Performed by: DERMATOLOGY

## 2021-09-09 RX ORDER — TRIAMCINOLONE ACETONIDE 1 MG/G
CREAM TOPICAL
Qty: 450 G | Refills: 0 | Status: SHIPPED | OUTPATIENT
Start: 2021-09-09

## 2021-09-09 NOTE — PROGRESS NOTES
[facility]  Anette Marie MD  [phone]      Sadia Guzman  1965    60 y.o. male     Date of Visit: 9/9/2021    Chief Complaint: rash    History of Present Illness:    1. He complains of a rash on the neck, axillae and groin that has been present for several weeks. He used some triamcinolone 0.1% cream recently with improvement in pruritus. Review of Systems:  Gen: Feels well, good sense of health. Past Medical History, Family History, Surgical History, Medications and Allergies reviewed.     Past Medical History:   Diagnosis Date    Allergic rhinitis 5/11/2012    Bender's esophagus     Colon polyps 8/22/2011    Depression     Erectile dysfunction     Gastroesophageal reflux disease without esophagitis 12/2/2015    GERD (gastroesophageal reflux disease)     Hemorrhoids, external     Hyperlipidemia     Hypothyroidism     Nonspecific elevation of levels of transaminase or lactic acid dehydrogenase (LDH)     Obsessive-compulsive disorder 7/6/2010    Other and unspecified disc disorder of cervical region     Tobacco use disorder     Vitamin B12 deficiency 12/20/2016    Vitamin D deficiency 12/20/2016     Past Surgical History:   Procedure Laterality Date    COLONOSCOPY  December 3, 2004    Dr. Stella Adair  September 14, 2010    Dr. Lyndsey Hayden COLONOSCOPY  12-15-15    Dr. Angela Riley  12/20/2016 December 22 2016 Dr Cuauhtemoc Lew  April 4, 2012    laparoscopic - bilateral - direct inguinal hernia repair (right greater than left) - mesh was used bilaterally ----  Dr. Michael Silver  February 2006    Dr. Justina Terry  June 23, 2009    Dr. Justina Terry  September 14, 2010     [facility]    UPPER GASTROINTESTINAL ENDOSCOPY  July 17, 2014    Dr. Hermosillo All  1/26/16    Dr. Jonna Soria       No Known Allergies  Outpatient Medications Marked as Taking for the 9/9/21 encounter (Office Visit) with Chastity Vivar MD   Medication Sig Dispense Refill    triamcinolone (KENALOG) 0.1 % cream Apply to affected area twice daily for up to 2 weeks or until improved. 450 g 0    Ciclopirox (LOPROX) 0.77 % gel Apply to the last web space on the right foot twice daily until improved. 30 g 0    atorvastatin (LIPITOR) 40 MG tablet Take 40 mg by mouth daily      Cyanocobalamin 1000 MCG/ML KIT 1 mL by NOT APPLICABLE route      SYNTHROID 75 MCG tablet Take 1 tablet by mouth daily (Patient taking differently: Take 100 mcg by mouth daily ) 30 tablet 12    FLUoxetine (PROZAC) 20 MG capsule Take 5 capsules by mouth daily      dexlansoprazole (DEXILANT) 60 MG CPDR delayed release capsule Take 1 capsule by mouth daily             Physical Examination       The following were examined and determined to be normal: Psych/Neuro, RUE and LUE. The following were examined and determined to be abnormal: Neck, RLE and LLE. Well appearing. 1.  Left lower neck - ill defined scaly erythematous patch. Proximal inner thighs with ill defined round scaly erythematous papules and patches. Assessment and Plan     1. Dermatitis of the neck and proximal thighs - likely irritant etiology from sweat and physicial activity    Triamcinolone 0.1% cream twice daily until improved.           --Chastity Vivar MD

## 2021-10-07 ENCOUNTER — TELEPHONE (OUTPATIENT)
Dept: DERMATOLOGY | Age: 56
End: 2021-10-07

## 2021-10-07 NOTE — TELEPHONE ENCOUNTER
Patient states he has a brownish, raised, itchy mole by collar bone he noticed a couple of days ago. Patient also has a swollen lymph node by his collar bone close to mole. Patient is requesting a return call to schedule an appt asap. Please advise. Thank you!

## 2021-10-07 NOTE — TELEPHONE ENCOUNTER
Informed patient that Dr Jarett Rodriguez currently doesn't have any upcoming openings as of now. However I let patient know that I will contact them when we get a cancellation. Patient verbalized understanding. He is flexible for scheduling.

## 2021-10-13 NOTE — TELEPHONE ENCOUNTER
Pt called and was not able to make his appt for 10/13 due to a work conflict. He sends his apologizes and hoping to be put back on the cancellation list. I did let him know of his appt in Dec.   Please advise, Thank you!

## 2021-10-19 NOTE — TELEPHONE ENCOUNTER
Called and left message for patient to call back office. Please offer cancellation for 10/20/21 if still available.

## 2021-10-21 NOTE — TELEPHONE ENCOUNTER
Called and left message for patient to call back office.      Please offer cancellation for 10/22/21 if still available.

## 2021-10-27 ENCOUNTER — OFFICE VISIT (OUTPATIENT)
Dept: DERMATOLOGY | Age: 56
End: 2021-10-27
Payer: COMMERCIAL

## 2021-10-27 VITALS — TEMPERATURE: 97.3 F

## 2021-10-27 DIAGNOSIS — L82.1 SK (SEBORRHEIC KERATOSIS): Primary | ICD-10-CM

## 2021-10-27 PROCEDURE — 99212 OFFICE O/P EST SF 10 MIN: CPT | Performed by: DERMATOLOGY

## 2021-10-27 NOTE — PROGRESS NOTES
[facility]  Camila Rodriguez MD  [phone]      Cain Mcmahan  1965    60 y.o. male     Date of Visit: 10/27/2021    Chief Complaint: skin lesions    History of Present Illness:    1. He complains of a couple of asymptomatic lesions on the left supraclavicular region and back. Review of Systems:  Gen: Feels well, good sense of health. Past Medical History, Family History, Surgical History, Medications and Allergies reviewed.     Past Medical History:   Diagnosis Date    Allergic rhinitis 5/11/2012    Bender's esophagus     Colon polyps 8/22/2011    Depression     Erectile dysfunction     Gastroesophageal reflux disease without esophagitis 12/2/2015    GERD (gastroesophageal reflux disease)     Hemorrhoids, external     Hyperlipidemia     Hypothyroidism     Nonspecific elevation of levels of transaminase or lactic acid dehydrogenase (LDH)     Obsessive-compulsive disorder 7/6/2010    Other and unspecified disc disorder of cervical region     Tobacco use disorder     Vitamin B12 deficiency 12/20/2016    Vitamin D deficiency 12/20/2016     Past Surgical History:   Procedure Laterality Date    COLONOSCOPY  December 3, 2004    Dr. Jenny Roland  September 14, 2010    Dr. Dimitris Parikh COLONOSCOPY  12-15-15    Dr. Jake Galaviz  12/20/2016 December 22 2016 Dr Ailyn Garcia  April 4, 2012    laparoscopic - bilateral - direct inguinal hernia repair (right greater than left) - mesh was used bilaterally ----  Dr. Kory Ashford  February 2006    Dr. Ammon Fernando  June 23, 2009    Dr. Ammon Fernando  September 14, 2010    Dr. Ammon Fernando  July 17, 2014     [facility]    UPPER GASTROINTESTINAL ENDOSCOPY  1/26/16    Dr. Oxana Son       No Known Allergies  Outpatient Medications Marked as Taking for the 10/27/21 encounter (Office Visit) with Cecy Oviedo MD   Medication Sig Dispense Refill    triamcinolone (KENALOG) 0.1 % cream Apply to affected area twice daily for up to 2 weeks or until improved. 450 g 0    Ciclopirox (LOPROX) 0.77 % gel Apply to the last web space on the right foot twice daily until improved. 30 g 0    atorvastatin (LIPITOR) 40 MG tablet Take 40 mg by mouth daily      Cyanocobalamin 1000 MCG/ML KIT 1 mL by NOT APPLICABLE route      SYNTHROID 75 MCG tablet Take 1 tablet by mouth daily (Patient taking differently: Take 100 mcg by mouth daily ) 30 tablet 12    FLUoxetine (PROZAC) 20 MG capsule Take 5 capsules by mouth daily      dexlansoprazole (DEXILANT) 60 MG CPDR delayed release capsule Take 1 capsule by mouth daily         Physical Examination       Well appearing. 1.  Left supraclavicular region and right mid back with 2 stuck on appearing round verrucous gray-brown papules. Assessment and Plan     1. SK (seborrheic keratosis)     Reassurance.           --Cecy Oviedo MD

## 2022-02-18 ENCOUNTER — TELEPHONE (OUTPATIENT)
Dept: DERMATOLOGY | Age: 57
End: 2022-02-18

## 2022-02-18 NOTE — TELEPHONE ENCOUNTER
Pt calling wanting to see  for a rash he have been dealing with off and on he is scheduled on 7/5 and he is on the wait list as well  have some concern wants to be seen sooner pls return call back @ [phone] to discuss

## 2022-02-21 ENCOUNTER — OFFICE VISIT (OUTPATIENT)
Dept: DERMATOLOGY | Age: 57
End: 2022-02-21
Payer: COMMERCIAL

## 2022-02-21 VITALS — TEMPERATURE: 96.8 F

## 2022-02-21 DIAGNOSIS — L30.9 CHRONIC DERMATITIS: Primary | ICD-10-CM

## 2022-02-21 DIAGNOSIS — L82.1 SK (SEBORRHEIC KERATOSIS): ICD-10-CM

## 2022-02-21 DIAGNOSIS — L81.4 SOLAR LENTIGO: ICD-10-CM

## 2022-02-21 PROCEDURE — 99213 OFFICE O/P EST LOW 20 MIN: CPT | Performed by: DERMATOLOGY

## 2022-02-21 RX ORDER — LANOLIN ALCOHOL/MO/W.PET/CERES
CREAM (GRAM) TOPICAL
COMMUNITY
Start: 2021-12-11

## 2022-02-21 RX ORDER — EZETIMIBE 10 MG/1
10 TABLET ORAL DAILY
COMMUNITY
Start: 2021-04-28

## 2022-02-21 RX ORDER — DEXTROAMPHETAMINE SACCHARATE, AMPHETAMINE ASPARTATE, DEXTROAMPHETAMINE SULFATE AND AMPHETAMINE SULFATE 5; 5; 5; 5 MG/1; MG/1; MG/1; MG/1
TABLET ORAL
COMMUNITY
Start: 2022-01-12

## 2022-02-21 NOTE — PROGRESS NOTES
[facility]  Centrevilleus Karan MD  [phone]      Elpidio Johnson  1965    60 y.o. male     Date of Visit: 2/21/2022    Chief Complaint: dermatitis    History of Present Illness:    1. Follow-up for history of dermatitis-has been flaring lately on the lower neck and right antecubital fossa. Has not been using any triamcinolone 0.1% cream.    2.  He also reports a persistent pigmented lesion on the right cheek. 3.  He complains of an asymptomatic growth on the left side of the chest.      Review of Systems:  Gen: Feels well, good sense of health. Past Medical History, Family History, Surgical History, Medications and Allergies reviewed.     Past Medical History:   Diagnosis Date    Allergic rhinitis 5/11/2012    Bender's esophagus     Colon polyps 8/22/2011    Depression     Erectile dysfunction     Gastroesophageal reflux disease without esophagitis 12/2/2015    GERD (gastroesophageal reflux disease)     Hemorrhoids, external     Hyperlipidemia     Hypothyroidism     Nonspecific elevation of levels of transaminase or lactic acid dehydrogenase (LDH)     Obsessive-compulsive disorder 7/6/2010    Other and unspecified disc disorder of cervical region     Tobacco use disorder     Vitamin B12 deficiency 12/20/2016    Vitamin D deficiency 12/20/2016     Past Surgical History:   Procedure Laterality Date    COLONOSCOPY  December 3, 2004    Dr. Chyna Bacon  September 14, 2010    Dr. Chelsea Bhatia COLONOSCOPY  12-15-15    Dr. Leo Burton  12/20/2016 December 22 2016 Dr Omar Souza  April 4, 2012    laparoscopic - bilateral - direct inguinal hernia repair (right greater than left) - mesh was used bilaterally ----  Dr. Marlene Canales  February 2006    Dr. Chelsea Bhatia UPPER GASTROINTESTINAL ENDOSCOPY  June 23, 2009    Dr. Charo Foy  September 14, 2010    Dr. Charo Foy  July 17, 2014    Dr. Natacha Pro  1/26/16    Dr. Wyatt Records       No Known Allergies  Outpatient Medications Marked as Taking for the 2/21/22 encounter (Office Visit) with Feliciana Hashimoto, MD   Medication Sig Dispense Refill    ezetimibe (ZETIA) 10 MG tablet Take 10 mg by mouth daily      vitamin B-12 (CYANOCOBALAMIN) 1000 MCG tablet       amphetamine-dextroamphetamine (ADDERALL) 20 MG tablet TAKE 1 TABLET BY MOUTH THREE TIMES DAILY      triamcinolone (KENALOG) 0.1 % cream Apply to affected area twice daily for up to 2 weeks or until improved. 450 g 0    Ciclopirox (LOPROX) 0.77 % gel Apply to the last web space on the right foot twice daily until improved. 30 g 0    atorvastatin (LIPITOR) 40 MG tablet Take 40 mg by mouth daily      Cyanocobalamin 1000 MCG/ML KIT 1 mL by NOT APPLICABLE route      SYNTHROID 75 MCG tablet Take 1 tablet by mouth daily (Patient taking differently: Take 100 mcg by mouth daily ) 30 tablet 12    FLUoxetine (PROZAC) 20 MG capsule Take 5 capsules by mouth daily      dexlansoprazole (DEXILANT) 60 MG CPDR delayed release capsule Take 1 capsule by mouth daily           Physical Examination       The following were examined and determined to be normal: Psych/Neuro, Scalp/hair, Head/face, Conjunctivae/eyelids, Gums/teeth/lips, Breast/axilla/chest, Abdomen, Back and LUE. The following were examined and determined to be abnormal: Neck, Breast/axilla/chest and RUE. Well appearing. 1.  Right lower neck and supraclavicular region > left, right AC fossa: ill defined scaly erythematous patches and plaques. 2.  Right malar cheek with a well-defined round smooth light brown macule.       3.  Right temple with a stuck on appearing slightly verrucous gray-brown papule. Left chest with a stuck on appearing verrucous tan papule. Left supraclavicular region with a stuck on appearing round verrucous brown papule. Assessment and Plan     1. Chronic dermatitis - limited to the neck and R AC fossa    Triamcinolone 0.1% cream twice daily for up to 2 weeks or until improved. 2. Solar lentigo     Monitor for change. 3. SK (seborrheic keratosis)     Reassurance.           --Yoan Bui MD

## 2022-07-05 ENCOUNTER — OFFICE VISIT (OUTPATIENT)
Dept: DERMATOLOGY | Age: 57
End: 2022-07-05
Payer: COMMERCIAL

## 2022-07-05 DIAGNOSIS — L81.4 SOLAR LENTIGO: ICD-10-CM

## 2022-07-05 DIAGNOSIS — L82.1 SK (SEBORRHEIC KERATOSIS): ICD-10-CM

## 2022-07-05 DIAGNOSIS — L30.9 CHRONIC DERMATITIS: Primary | ICD-10-CM

## 2022-07-05 PROCEDURE — 99213 OFFICE O/P EST LOW 20 MIN: CPT | Performed by: DERMATOLOGY

## 2022-07-05 NOTE — PROGRESS NOTES
[facility]  Jose Martin Littlejohn MD  [phone]      Wojciech Gonzalez  1965    60 y.o. male     Date of Visit: 7/5/2022    Chief Complaint: dermatitis,    History of Present Illness:    1. He has a history of chronic dermatitis - has had mild involvement of the right arm recently. 2.  He has 2 pigmented lesions on the right side of the face. 3.  He also has few growths on the back and left upper arm. Review of Systems:  Gen: Feels well, good sense of health. Past Medical History, Family History, Surgical History, Medications and Allergies reviewed.     Past Medical History:   Diagnosis Date    Allergic rhinitis 5/11/2012    Bender's esophagus     Colon polyps 8/22/2011    Depression     Erectile dysfunction     Gastroesophageal reflux disease without esophagitis 12/2/2015    GERD (gastroesophageal reflux disease)     Hemorrhoids, external     Hyperlipidemia     Hypothyroidism     Nonspecific elevation of levels of transaminase or lactic acid dehydrogenase (LDH)     Obsessive-compulsive disorder 7/6/2010    Other and unspecified disc disorder of cervical region     Tobacco use disorder     Vitamin B12 deficiency 12/20/2016    Vitamin D deficiency 12/20/2016     Past Surgical History:   Procedure Laterality Date    COLONOSCOPY  December 3, 2004    Dr. Bridget Phillips  September 14, 2010    Dr. Catia Georges COLONOSCOPY  12-15-15    Dr. Sathish Cheema  12/20/2016 December 22 2016 Dr Elmo Burnham  April 4, 2012    laparoscopic - bilateral - direct inguinal hernia repair (right greater than left) - mesh was used bilaterally ----  Dr. Kobe Lindsay  February 2006    Dr. Faizan Waite  June 23, 2009    Dr. Geoffrey Salomon GASTROINTESTINAL ENDOSCOPY  September 14, 2010    Dr. Humaira Vilchis  July 17, 2014    Dr. Stella Luna  1/26/16    Dr. William Huang       No Known Allergies  Outpatient Medications Marked as Taking for the 7/5/22 encounter (Office Visit) with Josue Vinson MD   Medication Sig Dispense Refill    ezetimibe (ZETIA) 10 MG tablet Take 10 mg by mouth daily      vitamin B-12 (CYANOCOBALAMIN) 1000 MCG tablet       amphetamine-dextroamphetamine (ADDERALL) 20 MG tablet TAKE 1 TABLET BY MOUTH THREE TIMES DAILY      triamcinolone (KENALOG) 0.1 % cream Apply to affected area twice daily for up to 2 weeks or until improved. 450 g 0    Ciclopirox (LOPROX) 0.77 % gel Apply to the last web space on the right foot twice daily until improved. 30 g 0    atorvastatin (LIPITOR) 40 MG tablet Take 40 mg by mouth daily      Cyanocobalamin 1000 MCG/ML KIT 1 mL by NOT APPLICABLE route      SYNTHROID 75 MCG tablet Take 1 tablet by mouth daily (Patient taking differently: Take 100 mcg by mouth daily ) 30 tablet 12    FLUoxetine (PROZAC) 20 MG capsule Take 5 capsules by mouth daily      dexlansoprazole (DEXILANT) 60 MG CPDR delayed release capsule Take 1 capsule by mouth daily           Physical Examination       The following were examined and determined to be normal: Psych/Neuro, Scalp/hair, Head/face, Conjunctivae/eyelids, Gums/teeth/lips, Neck, Breast/axilla/chest, Abdomen, Back, LUE, RLE, LLE and Nails/digits. The following were examined and determined to be abnormal: RUE. Well appearing. 1.  Right AC fossa - ill defined scaly pink patch. 2.  Right temple and cheek - 2 round smooth light brown macules. 3.  Back, left upper arm - stuck on appearing brown papules. Assessment and Plan     1. Chronic dermatitis - limited and mild    Triamcinolone 0.1% cream twice daily for up to 2 weeks or until improved.        2. Solar lentigo x 2 Monitor for change. Sun protective behaviors encouraged. 2 short cycles of liquid nitrogen applied with the Cry AC to the lower lesion on the right cheek - no charge. 3. SK (seborrheic keratosis)     Reassurance. Return in about 1 year (around 7/5/2023).     --Marvin Taylor MD

## 2022-08-26 LAB
ALBUMIN SERPL-MCNC: 4.9 G/DL (ref 3.4–5)
ANION GAP SERPL CALCULATED.3IONS-SCNC: 10 MMOL/L (ref 3–16)
BUN BLDV-MCNC: 16 MG/DL (ref 7–20)
CALCIUM SERPL-MCNC: 9.7 MG/DL (ref 8.3–10.6)
CHLORIDE BLD-SCNC: 101 MMOL/L (ref 99–110)
CO2: 29 MMOL/L (ref 21–32)
CREAT SERPL-MCNC: 1.2 MG/DL (ref 0.9–1.3)
GFR AFRICAN AMERICAN: >60
GFR NON-AFRICAN AMERICAN: >60
GLUCOSE BLD-MCNC: 94 MG/DL (ref 70–99)
PHOSPHORUS: 3.2 MG/DL (ref 2.5–4.9)
POTASSIUM SERPL-SCNC: 4.7 MMOL/L (ref 3.5–5.1)
SODIUM BLD-SCNC: 140 MMOL/L (ref 136–145)

## 2023-04-04 ENCOUNTER — TELEPHONE (OUTPATIENT)
Dept: DERMATOLOGY | Age: 58
End: 2023-04-04

## 2023-04-04 RX ORDER — TRIAMCINOLONE ACETONIDE 1 MG/G
CREAM TOPICAL
Qty: 80 G | Refills: 0 | Status: SHIPPED | OUTPATIENT
Start: 2023-04-04

## 2023-04-04 NOTE — TELEPHONE ENCOUNTER
Patient scheduled for 4/6/23 at 4:30pm for rash in groin area. Patient is still requesting refill of Triamcinolone for dermatitis under arm.

## 2023-04-04 NOTE — TELEPHONE ENCOUNTER
Called and left message for patient to call back office. Advised patient will send Rx request for Triamcinolone and asked patient to provide more information regarding rash in his groin area.

## 2023-04-04 NOTE — TELEPHONE ENCOUNTER
Pt is calling. He has jock itch and a rash under his arm. He is wondering if he can have some Triamcinolone cream called in and also the cream that was used for the rash on his arms. C[facility] Morales [facility].   Phone is [phone]  Fax is [phone]    Pt phone is [phone]    Pt has an appt in July  Last office visit 7/5/22

## 2023-04-06 ENCOUNTER — OFFICE VISIT (OUTPATIENT)
Dept: DERMATOLOGY | Age: 58
End: 2023-04-06
Payer: COMMERCIAL

## 2023-04-06 DIAGNOSIS — L30.9 CHRONIC DERMATITIS: Primary | ICD-10-CM

## 2023-04-06 DIAGNOSIS — L30.4 INTERTRIGO: ICD-10-CM

## 2023-04-06 DIAGNOSIS — L82.1 SK (SEBORRHEIC KERATOSIS): ICD-10-CM

## 2023-04-06 DIAGNOSIS — L91.8 SKIN TAG: ICD-10-CM

## 2023-04-06 PROCEDURE — 11200 RMVL SKIN TAGS UP TO&INC 15: CPT | Performed by: DERMATOLOGY

## 2023-04-06 PROCEDURE — 99213 OFFICE O/P EST LOW 20 MIN: CPT | Performed by: DERMATOLOGY

## 2023-04-06 NOTE — PROGRESS NOTES
June 23, 2009    Dr. Oscar Martin  September 14, 2010    Dr. Oscar Martin  July 17, 2014    Dr. Knott Early  1/26/16    Dr. Maldonado Toledo       No Known Allergies  Outpatient Medications Marked as Taking for the 4/6/23 encounter (Office Visit) with Ele Tellez MD   Medication Sig Dispense Refill    triamcinolone (KENALOG) 0.1 % cream Apply to affected area twice daily for up to 2 weeks or until improved. 80 g 0    ezetimibe (ZETIA) 10 MG tablet Take 1 tablet by mouth daily      amphetamine-dextroamphetamine (ADDERALL) 20 MG tablet TAKE 1 TABLET BY MOUTH THREE TIMES DAILY      atorvastatin (LIPITOR) 40 MG tablet Take 1 tablet by mouth daily      Cyanocobalamin 1000 MCG/ML KIT 1 mL by NOT APPLICABLE route      SYNTHROID 75 MCG tablet Take 1 tablet by mouth daily (Patient taking differently: Take 100 mcg by mouth daily) 30 tablet 12    FLUoxetine (PROZAC) 20 MG capsule Take 5 capsules by mouth daily      dexlansoprazole (DEXILANT) 60 MG CPDR delayed release capsule Take 1 capsule by mouth daily           Physical Examination         Well-appearing. 1.  Posterior aspect of the right axillary region with a lichenified scaly pink plaque. 2.  Inguinal folds with ill-defined mildly erythematous patches. 3.  Right central cheek with a small stuck on appearing verrucous brown papule. 4.  Right perineal region with a pedunculated skin colored papule. Assessment and Plan     1. Chronic dermatitis - likely irritant etiology    Triamcinolone 0.1% cream twice daily for up to 2 weeks or until improved. 2. Intertrigo - mild    Triamcinolone 0.1% cream twice daily for up to 2 weeks or until improved. 3. SK (seborrheic keratosis)     Reassurance. 4. Skin tag in the perineum - recurrently irritated and inflamed due to location and trauma.     Cryotherapy was discussed and patient agreed

## 2023-07-06 ENCOUNTER — OFFICE VISIT (OUTPATIENT)
Dept: DERMATOLOGY | Age: 58
End: 2023-07-06
Payer: COMMERCIAL

## 2023-07-06 DIAGNOSIS — L82.1 SK (SEBORRHEIC KERATOSIS): ICD-10-CM

## 2023-07-06 DIAGNOSIS — L91.8 SKIN TAG: ICD-10-CM

## 2023-07-06 DIAGNOSIS — L30.9 CHRONIC DERMATITIS: Primary | ICD-10-CM

## 2023-07-06 DIAGNOSIS — L81.4 SOLAR LENTIGO: ICD-10-CM

## 2023-07-06 PROCEDURE — 99213 OFFICE O/P EST LOW 20 MIN: CPT | Performed by: DERMATOLOGY

## 2023-07-06 RX ORDER — TRIAMCINOLONE ACETONIDE 1 MG/G
CREAM TOPICAL
Qty: 80 G | Refills: 2 | Status: SHIPPED | OUTPATIENT
Start: 2023-07-06

## 2023-07-06 NOTE — PROGRESS NOTES
[facility]  Vanesa Guardado MD  [phone]      Gabbie Dryer  1965    59 y.o. male     Date of Visit: 7/6/2023    Chief Complaint: skin lesions    History of Present Illness:    1. He has a history of chronic dermatitis - complains of some activity on the R  AC fossa. Improved in the past with triamcinolone 0.1% cream.       2.  He reports a growth on the central chest that his wife is concerned with. 3.  He has a stable pigmented lesion on the right temple. 4.  He reports an asymptomatic growth on the right upper eyeli. Review of Systems:  Gen: Feels well, good sense of health. Past Medical History, Family History, Surgical History, Medications and Allergies reviewed.     Past Medical History:   Diagnosis Date    Allergic rhinitis 5/11/2012    Bender's esophagus     Colon polyps 8/22/2011    Depression     Erectile dysfunction     Gastroesophageal reflux disease without esophagitis 12/2/2015    GERD (gastroesophageal reflux disease)     Hemorrhoids, external     Hyperlipidemia     Hypothyroidism     Nonspecific elevation of levels of transaminase or lactic acid dehydrogenase (LDH)     Obsessive-compulsive disorder 7/6/2010    Other and unspecified disc disorder of cervical region     Tobacco use disorder     Vitamin B12 deficiency 12/20/2016    Vitamin D deficiency 12/20/2016     Past Surgical History:   Procedure Laterality Date    COLONOSCOPY  December 3, 2004    Dr. Addison Mitchell    COLONOSCOPY  September 14, 2010    Dr. Addison Mitchell    COLONOSCOPY  12-15-15    Dr. Bill Terrazas  12/20/2016 December 22 2016 Dr Samanta Ellsworth  April 4, 2012    laparoscopic - bilateral - direct inguinal hernia repair (right greater than left) - mesh was used bilaterally ----  Dr. Scott Mchugh  February 2006

## 2023-10-11 ENCOUNTER — PATIENT MESSAGE (OUTPATIENT)
Dept: DERMATOLOGY | Age: 58
End: 2023-10-11

## 2023-11-06 ENCOUNTER — PATIENT MESSAGE (OUTPATIENT)
Dept: DERMATOLOGY | Age: 58
End: 2023-11-06

## 2023-11-07 ENCOUNTER — OFFICE VISIT (OUTPATIENT)
Dept: DERMATOLOGY | Age: 58
End: 2023-11-07

## 2023-11-07 DIAGNOSIS — L82.0 INFLAMED SEBORRHEIC KERATOSIS: Primary | ICD-10-CM

## 2023-11-07 DIAGNOSIS — L30.9 CHRONIC DERMATITIS: ICD-10-CM

## 2023-11-07 NOTE — PATIENT INSTRUCTIONS
Biopsy Wound Care Instructions    Keep the bandage in place for 24 hours. Cleanse the wound with mild soapy water daily  Gently dry the area. Apply Vaseline or petroleum jelly to the wound using a cotton tipped applicator. Cover with a clean bandage. Repeat this process until the biopsy site is healed. If you had stitches placed, continue treating the site until the stitches are removed. Remember to make an appointment to return to have your stitches removed by our staff. You may shower and bathe as usual.       ** Biopsy results generally take around 7 business days to come back. If you have not heard from us by then, please call the office at [phone]. *Please note that biopsy results are released to both the patient and physician at the same time in [address] [place] [address]. Please allow time for your physician to review the results. One of our staff members will reach out to you with the results and plan.

## 2023-11-07 NOTE — PROGRESS NOTES
[facility]  Giuliana Laguna MD  [phone]      Mercedes Padilla  1965    59 y.o. male     Date of Visit: 11/7/2023    Chief Complaint: skin lesion    History of Present Illness:    1. He presents today for changing scalp lesion on the left supraclavicular region. 2.  He has a history of chronic dermatitis-reports good control with intermittent use of triamcinolone 0.1% cream.      Review of Systems:  Gen: Feels well, good sense of health. Past Medical History, Family History, Surgical History, Medications and Allergies reviewed.     Past Medical History:   Diagnosis Date    Allergic rhinitis 5/11/2012    Bender's esophagus     Colon polyps 8/22/2011    Depression     Erectile dysfunction     Gastroesophageal reflux disease without esophagitis 12/2/2015    GERD (gastroesophageal reflux disease)     Hemorrhoids, external     Hyperlipidemia     Hypothyroidism     Nonspecific elevation of levels of transaminase or lactic acid dehydrogenase (LDH)     Obsessive-compulsive disorder 7/6/2010    Other and unspecified disc disorder of cervical region     Tobacco use disorder     Vitamin B12 deficiency 12/20/2016    Vitamin D deficiency 12/20/2016     Past Surgical History:   Procedure Laterality Date    COLONOSCOPY  December 3, 2004    Dr. Tara Dhaliwal    COLONOSCOPY  September 14, 2010    Dr. Tara Dhaliwal    COLONOSCOPY  12-15-15    Dr. Jones Chew  12/20/2016 December 22 2016 Dr Ira Mendiola  April 4, 2012    laparoscopic - bilateral - direct inguinal hernia repair (right greater than left) - mesh was used bilaterally ----  Dr. Katelin Waller  February 2006    Dr. Roland Holm  June 23, 2009    Dr. Roland Holm  September 14, 2010

## 2023-11-10 LAB — DERMATOLOGY PATHOLOGY REPORT: NORMAL

## 2024-01-09 ENCOUNTER — PATIENT MESSAGE (OUTPATIENT)
Dept: DERMATOLOGY | Age: 59
End: 2024-01-09

## 2024-01-09 RX ORDER — TRIAMCINOLONE ACETONIDE 1 MG/G
CREAM TOPICAL
Qty: 454 G | Refills: 0 | Status: SHIPPED | OUTPATIENT
Start: 2024-01-09

## 2024-01-12 RX ORDER — PREDNISONE 10 MG/1
TABLET ORAL
Qty: 40 TABLET | Refills: 0 | Status: SHIPPED | OUTPATIENT
Start: 2024-01-12 | End: 2024-01-28

## 2024-01-15 ENCOUNTER — OFFICE VISIT (OUTPATIENT)
Dept: DERMATOLOGY | Age: 59
End: 2024-01-15
Payer: COMMERCIAL

## 2024-01-15 ENCOUNTER — TELEPHONE (OUTPATIENT)
Dept: DERMATOLOGY | Age: 59
End: 2024-01-15

## 2024-01-15 DIAGNOSIS — L50.3 DERMATOGRAPHISM: ICD-10-CM

## 2024-01-15 DIAGNOSIS — L30.9 CHRONIC DERMATITIS: Primary | ICD-10-CM

## 2024-01-15 PROCEDURE — 96372 THER/PROPH/DIAG INJ SC/IM: CPT | Performed by: DERMATOLOGY

## 2024-01-15 PROCEDURE — 99214 OFFICE O/P EST MOD 30 MIN: CPT | Performed by: DERMATOLOGY

## 2024-01-15 RX ORDER — TRIAMCINOLONE ACETONIDE 40 MG/ML
80 INJECTION, SUSPENSION INTRA-ARTICULAR; INTRAMUSCULAR ONCE
Status: COMPLETED | OUTPATIENT
Start: 2024-01-15 | End: 2024-01-15

## 2024-01-15 RX ADMIN — TRIAMCINOLONE ACETONIDE 80 MG: 40 INJECTION, SUSPENSION INTRA-ARTICULAR; INTRAMUSCULAR at 16:52

## 2024-01-15 NOTE — TELEPHONE ENCOUNTER
From: Hiro Bermeo  To: Dr. Nelson Jeronimo  Sent: 1/9/2024 11:40 AM EST  Subject: rash again    Hi Zander- Happy New Year to you and your family.    I've been getting a bad rash again- on the arm for a few weeks, off and on, but all over my trunk now (and my neck and head) (past 2 days) Itches like crazy! Can you please order a jar of that cream (Cierra Sveta) for me? Or should I try a steroid pack? I took a Benadryl last night for some relief.    Thanks,   Marcus Bermeo  [phone]

## 2024-01-15 NOTE — TELEPHONE ENCOUNTER
Called and left message for patient to call back office.     Please offer today at 4:30pm (add on) if still available or any opening this week.

## 2024-01-15 NOTE — TELEPHONE ENCOUNTER
Pt calling Shawanda back regarding rash he is available for an appt today before noon or after two please call him back at [phone].

## 2024-01-15 NOTE — PROGRESS NOTES
Start Claritin or Zyrtec daily.          Return in about 1 month (around 2/15/2024).    --Nelson Jeronimo MD

## 2024-02-02 ENCOUNTER — TELEPHONE (OUTPATIENT)
Dept: DERMATOLOGY | Age: 59
End: 2024-02-02

## 2024-02-02 NOTE — TELEPHONE ENCOUNTER
Patient calling his rash is back and he would like to get in for an appt steroids cleared it up but it has come back [phone].

## 2024-02-02 NOTE — TELEPHONE ENCOUNTER
Called and left message for patient to call back office.     Please offer opening on 2/5 if still available.

## 2024-04-21 ENCOUNTER — PATIENT MESSAGE (OUTPATIENT)
Dept: DERMATOLOGY | Age: 59
End: 2024-04-21

## 2024-04-22 NOTE — TELEPHONE ENCOUNTER
From: Hiro Bermeo  To: Dr. Nelson Jeronimo  Sent: 4/21/2024 9:57 PM EDT  Subject: Daughter patrizia Jerez Doc-     My daughter, Patrizia, has a rash/bumps (for about 2 weeks) that are spreading on her chest, back and stomach. Is there any time this week that you can see her? Patrizia's # is [phone]    Thank you,    Marcus Bermeo

## 2024-05-07 ENCOUNTER — PATIENT MESSAGE (OUTPATIENT)
Dept: DERMATOLOGY | Age: 59
End: 2024-05-07

## 2024-07-08 ENCOUNTER — OFFICE VISIT (OUTPATIENT)
Dept: DERMATOLOGY | Age: 59
End: 2024-07-08
Payer: COMMERCIAL

## 2024-07-08 DIAGNOSIS — L30.9 CHRONIC DERMATITIS: ICD-10-CM

## 2024-07-08 DIAGNOSIS — L82.1 SK (SEBORRHEIC KERATOSIS): Primary | ICD-10-CM

## 2024-07-08 PROCEDURE — 99213 OFFICE O/P EST LOW 20 MIN: CPT | Performed by: DERMATOLOGY

## 2024-07-08 RX ORDER — TRIAMCINOLONE ACETONIDE 1 MG/G
CREAM TOPICAL
Qty: 454 G | Refills: 0 | Status: SHIPPED | OUTPATIENT
Start: 2024-07-08

## 2024-07-08 NOTE — PROGRESS NOTES
[facility]  Nelson Jeronimo MD  [phone]      Hiro Bermeo  1965    58 y.o. male     Date of Visit: 7/8/2024    Chief Complaint: skin lesions    History of Present Illness:    1.  He presents today for evaluation of a growth on the central portion of the chest.  He has similar lesions on the back.    2.  He also has a history of extensive dermatitis requiring intramuscular Kenalog and topical steroid therapy in the past.  It has been quiescent so far this summer.      Review of Systems:  Gen: Feels well, good sense of health.    Past Medical History, Family History, Surgical History, Medications and Allergies reviewed.    Past Medical History:   Diagnosis Date    Allergic rhinitis 5/11/2012    Bender's esophagus     Colon polyps 8/22/2011    Depression     Erectile dysfunction     Gastroesophageal reflux disease without esophagitis 12/2/2015    GERD (gastroesophageal reflux disease)     Hemorrhoids, external     Hyperlipidemia     Hypothyroidism     Nonspecific elevation of levels of transaminase or lactic acid dehydrogenase (LDH)     Obsessive-compulsive disorder 7/6/2010    Other and unspecified disc disorder of cervical region     Tobacco use disorder     Vitamin B12 deficiency 12/20/2016    Vitamin D deficiency 12/20/2016     Past Surgical History:   Procedure Laterality Date    COLONOSCOPY  December 3, 2004    Dr. Jean Deluna    COLONOSCOPY  September 14, 2010    Dr. Jean Deluna    COLONOSCOPY  12-15-15    Dr. Raj Paul    COLONOSCOPY  12/20/2016 December 22 2016 Dr Warren Paul [facility]    INGUINAL HERNIA REPAIR  April 4, 2012    laparoscopic - bilateral - direct inguinal hernia repair (right greater than left) - mesh was used bilaterally ----  Dr. Johny Vargas    NASAL FRACTURE SURGERY  1971    PILONIDAL CYST EXCISION  1974    UPPER GASTROINTESTINAL ENDOSCOPY  February 2006    Dr. Jean Deluna    UPPER GASTROINTESTINAL ENDOSCOPY  June 23,

## 2025-07-10 ENCOUNTER — OFFICE VISIT (OUTPATIENT)
Age: 60
End: 2025-07-10
Payer: COMMERCIAL

## 2025-07-10 DIAGNOSIS — L30.9 CHRONIC DERMATITIS: ICD-10-CM

## 2025-07-10 DIAGNOSIS — L82.1 SK (SEBORRHEIC KERATOSIS): Primary | ICD-10-CM

## 2025-07-10 DIAGNOSIS — D22.9 MULTIPLE NEVI: ICD-10-CM

## 2025-07-10 PROCEDURE — 99213 OFFICE O/P EST LOW 20 MIN: CPT | Performed by: DERMATOLOGY

## 2025-07-10 RX ORDER — AMLODIPINE BESYLATE 5 MG/1
5 TABLET ORAL DAILY
COMMUNITY
Start: 2024-10-25

## 2025-07-10 RX ORDER — BUPROPION HYDROCHLORIDE 300 MG/1
300 TABLET ORAL EVERY MORNING
COMMUNITY
Start: 2025-01-24

## 2025-07-10 NOTE — PROGRESS NOTES
UPPER GASTROINTESTINAL ENDOSCOPY  June 23, 2009    Dr. Jean Deluna    UPPER GASTROINTESTINAL ENDOSCOPY  September 14, 2010    Dr. Jean Deluna    UPPER GASTROINTESTINAL ENDOSCOPY  July 17, 2014    Dr. Raj Paul    UPPER GASTROINTESTINAL ENDOSCOPY  1/26/16    Dr. Raj Paul       No Known Allergies  Outpatient Medications Marked as Taking for the 7/10/25 encounter (Office Visit) with Nelson Jeronimo MD   Medication Sig Dispense Refill    amLODIPine (NORVASC) 5 MG tablet Take 1 tablet by mouth daily      buPROPion (WELLBUTRIN XL) 300 MG extended release tablet Take 1 tablet by mouth every morning      Evolocumab (REPATHA) SOAJ pen Inject 1 mL into the skin every 14 days      triamcinolone (KENALOG) 0.1 % cream Apply to affected area twice daily for up to 2 weeks or until improved. 454 g 0    ezetimibe (ZETIA) 10 MG tablet Take 1 tablet by mouth daily      amphetamine-dextroamphetamine (ADDERALL) 20 MG tablet TAKE 1 TABLET BY MOUTH THREE TIMES DAILY      atorvastatin (LIPITOR) 40 MG tablet Take 1 tablet by mouth daily      Cyanocobalamin 1000 MCG/ML KIT 1 mL by NOT APPLICABLE route      SYNTHROID 75 MCG tablet Take 1 tablet by mouth daily (Patient taking differently: Take 100 mcg by mouth daily) 30 tablet 12    FLUoxetine (PROZAC) 20 MG capsule Take 5 capsules by mouth daily      dexlansoprazole (DEXILANT) 60 MG CPDR delayed release capsule Take 1 capsule by mouth daily         Physical Examination     Well appearing.    1.  Left upper arm, right upper arm, presternal chest, back, left shin with stuck-on appearing tan-brown verrucous papules and plaques.     2.  Trunk with scattered round to oval smooth pink and brown papules.     3.  Clear.          Assessment and Plan     1. SK (seborrheic keratosis) - several    Reassurance.      2. Multiple nevi - benign appearing    Sun protective behaviors, including use of at least SPF 30 sunscreen, and self skin examinations were encouraged.  Call for any new or